# Patient Record
Sex: FEMALE | Race: WHITE | URBAN - METROPOLITAN AREA
[De-identification: names, ages, dates, MRNs, and addresses within clinical notes are randomized per-mention and may not be internally consistent; named-entity substitution may affect disease eponyms.]

---

## 2023-11-16 ENCOUNTER — OFFICE VISIT (OUTPATIENT)
Dept: ORTHOPEDIC SURGERY | Age: 68
End: 2023-11-16

## 2023-11-16 VITALS — HEIGHT: 66 IN

## 2023-11-16 DIAGNOSIS — M17.11 PRIMARY OSTEOARTHRITIS OF RIGHT KNEE: ICD-10-CM

## 2023-11-16 DIAGNOSIS — M25.561 RIGHT KNEE PAIN, UNSPECIFIED CHRONICITY: Primary | ICD-10-CM

## 2023-11-16 PROBLEM — S80.10XA HEMATOMA OF LOWER EXTREMITY: Status: ACTIVE | Noted: 2022-12-08

## 2023-11-16 PROBLEM — L40.9 PSORIASIS: Status: ACTIVE | Noted: 2023-11-16

## 2023-11-16 PROBLEM — E78.5 DYSLIPIDEMIA: Status: ACTIVE | Noted: 2023-05-02

## 2023-11-16 PROBLEM — I10 ESSENTIAL HYPERTENSION: Status: ACTIVE | Noted: 2023-04-05

## 2023-11-16 PROBLEM — G47.33 OBSTRUCTIVE SLEEP APNEA: Status: ACTIVE | Noted: 2018-07-11

## 2023-11-16 PROBLEM — I82.432 ACUTE DEEP VEIN THROMBOSIS (DVT) OF POPLITEAL VEIN OF LEFT LOWER EXTREMITY (HCC): Status: ACTIVE | Noted: 2022-12-08

## 2023-11-16 PROBLEM — E55.9 VITAMIN D DEFICIENCY: Status: ACTIVE | Noted: 2018-06-21

## 2023-11-16 NOTE — PATIENT INSTRUCTIONS
Patient Education        Knee Arthritis: Exercises  Introduction  Here are some examples of exercises for you to try. The exercises may be suggested for a condition or for rehabilitation. Start each exercise slowly. Ease off the exercises if you start to have pain. You will be told when to start these exercises and which ones will work best for you. How to do the exercises  Heel slide (ankles crossed)    Lie on your back with your knees bent. Slide your heel back by bending your affected knee as far as you can. Then hook your other foot around your ankle to help pull your heel even farther back. Hold for about 6 seconds. Return to your starting position. Repeat 8 to 12 times. If you can, repeat these steps for your other knee. Quad set    Sit or lie down on a firm surface or the floor with your affected leg straight. Place a small, rolled-up towel under your knee. Tighten the thigh muscles of your straight leg by pressing the back of your knee down into the towel. Hold for about 6 seconds, then rest.  Repeat 8 to 12 times. It's a good idea to repeat these steps with your other leg. Straight-leg raises to the front    Lie on your back with your good knee bent so that your foot rests flat on the floor. Your affected leg should be straight. Make sure that your low back has a normal curve. You should be able to slip your hand in between the floor and the small of your back, with your palm touching the floor and your back touching the back of your hand. Tighten the thigh muscles in your affected leg by pressing the back of your knee flat down to the floor. Hold your knee straight. Keeping the thigh muscles tight and your leg straight, lift your affected leg up so that your heel is about 12 inches off the floor. Hold for about 6 seconds, then lower slowly. Relax for up to 10 seconds between repetitions. Repeat 8 to 12 times. Switch legs and repeat steps 1 through 5, even if only one knee is sore.   Active

## 2023-11-16 NOTE — PROGRESS NOTES
Name: Migdalia Turner  YOB: 1955  Gender: female  MRN: [id number]    CC:   Chief Complaint   Patient presents with    Knee Pain     Right knee         HPI: Migdalia Turner is a 70 y.o. female with a PMHx of HTN, DVT on xarelto , and s/p left TKA in 2023 by Dr. Carolann Jones here for evaluation of right knee pain. The pain has been present for years and is becoming worse. The pain is primarily on the Medial side. she describes the pain as dull and aching  The pain is worse with going up and/or down stairs and walking  The pain does not radiate down the leg. she denies numbness and tingling down the leg. Treatment so far has been activity modification, Tylenol, and visco supplementation injections with relief. Last durolane injection was in July of this year and has helped well with her symptoms at this time. Not on File      Review of Systems:  As per HPI. Pertinent positives and negatives are addressed with the patient, particularly those related to musculoskeletal concerns. Non-orthopaedic concerns were referred back to the primary care physician. PHYSICAL EXAMINATION:   The patient appears their stated age and they are in no distress. Ht 1.676 m (5' 6\")       The lower extremities are as described below. Circulation is normal with palpable pedal pulses bilaterally and no edema. There is no lymph adenopathy in the popliteal or malleolar region. The skin is without stasis disease distally bilaterally. Sensation is intact to light touch bilaterally. There is mild tenderness to palpation over the medial joint line of the right knee(s)  The gait is noted to be with a slight trendelenburg and antalgia  Range of motion is 0-120 degrees on the right and 0-120 degrees on the left.  right knee: There is 2mm of anterior/posterior translation and 2mm of medial/lateral instability bilaterally. There is 2 degrees of varus alignment in the right knee.   Limb lengths are equal.  Quadriceps strength

## 2024-01-25 ENCOUNTER — OFFICE VISIT (OUTPATIENT)
Dept: ORTHOPEDIC SURGERY | Age: 69
End: 2024-01-25

## 2024-01-25 DIAGNOSIS — M17.11 PRIMARY OSTEOARTHRITIS OF RIGHT KNEE: Primary | ICD-10-CM

## 2024-01-25 NOTE — PROGRESS NOTES
Name: Aliya Adams  YOB: 1955  Gender: female  MRN: [id number]    Not on File    CC:  right knee pain, Osteoarthritis    PROCEDURE: HA injection(s). All questions answered.     Procedure Note: The patient was placed in upright position with both knees hanging freely from exam table.  The right knee was prepped in sterile fashion using alcohol wipe(s).  Using an infrapatellar approach, 3cc of Durolane was injected freely.  The needle was then removed, pressure hemostatis achieved, injection site was cleansed with alcohol wipe and dressed with band aid.    The patient tolerated the procedure without complication.  The patient  will follow up as scheduled.    CARLOS MAO JR, MD  01/25/24

## 2024-01-26 ENCOUNTER — TELEPHONE (OUTPATIENT)
Dept: ORTHOPEDIC SURGERY | Age: 69
End: 2024-01-26

## 2024-01-26 NOTE — TELEPHONE ENCOUNTER
Confirmed patient [place] walking around the mall after injection, was in lots of knee pain last night but is better today. Instructed to take it easy the next 24 hours ice & will continue tylenol/ibuprofen. The patient will update us in the next 3-4 days if medicine has not kicked I or pain is not much better.

## 2024-01-26 NOTE — TELEPHONE ENCOUNTER
She had significant pain in her knee that she got the gel shot in. She wants to know if this is normal. She is asking for a return call.

## 2024-04-17 ENCOUNTER — TELEPHONE (OUTPATIENT)
Dept: ORTHOPEDIC SURGERY | Age: 69
End: 2024-04-17

## 2024-08-22 ENCOUNTER — TELEPHONE (OUTPATIENT)
Dept: ORTHOPEDIC SURGERY | Age: 69
End: 2024-08-22

## 2024-08-22 DIAGNOSIS — M17.11 PRIMARY OSTEOARTHRITIS OF RIGHT KNEE: Primary | ICD-10-CM

## 2024-09-17 ENCOUNTER — OFFICE VISIT (OUTPATIENT)
Dept: ORTHOPEDIC SURGERY | Age: 69
End: 2024-09-17
Payer: MEDICARE

## 2024-09-17 DIAGNOSIS — M17.11 PRIMARY OSTEOARTHRITIS OF RIGHT KNEE: Primary | ICD-10-CM

## 2024-09-17 PROCEDURE — 20610 DRAIN/INJ JOINT/BURSA W/O US: CPT | Performed by: ORTHOPAEDIC SURGERY

## 2024-12-11 ENCOUNTER — TELEPHONE (OUTPATIENT)
Dept: ORTHOPEDIC SURGERY | Age: 69
End: 2024-12-11

## 2024-12-11 NOTE — TELEPHONE ENCOUNTER
Spoke with patient and will scheduled for rt tka on 05/23/25 and put her on the cancellation list for earlier date

## 2025-01-28 ENCOUNTER — OFFICE VISIT (OUTPATIENT)
Dept: ORTHOPEDIC SURGERY | Age: 70
End: 2025-01-28
Payer: MEDICARE

## 2025-01-28 DIAGNOSIS — M17.11 PRIMARY OSTEOARTHRITIS OF RIGHT KNEE: Primary | ICD-10-CM

## 2025-01-28 PROCEDURE — 99215 OFFICE O/P EST HI 40 MIN: CPT | Performed by: ORTHOPAEDIC SURGERY

## 2025-01-28 PROCEDURE — G8427 DOCREV CUR MEDS BY ELIG CLIN: HCPCS | Performed by: ORTHOPAEDIC SURGERY

## 2025-01-28 PROCEDURE — G8421 BMI NOT CALCULATED: HCPCS | Performed by: ORTHOPAEDIC SURGERY

## 2025-01-28 PROCEDURE — 1090F PRES/ABSN URINE INCON ASSESS: CPT | Performed by: ORTHOPAEDIC SURGERY

## 2025-01-28 PROCEDURE — 1159F MED LIST DOCD IN RCRD: CPT | Performed by: ORTHOPAEDIC SURGERY

## 2025-01-28 PROCEDURE — 1123F ACP DISCUSS/DSCN MKR DOCD: CPT | Performed by: ORTHOPAEDIC SURGERY

## 2025-01-28 PROCEDURE — 4004F PT TOBACCO SCREEN RCVD TLK: CPT | Performed by: ORTHOPAEDIC SURGERY

## 2025-01-28 PROCEDURE — 1160F RVW MEDS BY RX/DR IN RCRD: CPT | Performed by: ORTHOPAEDIC SURGERY

## 2025-01-28 PROCEDURE — 3017F COLORECTAL CA SCREEN DOC REV: CPT | Performed by: ORTHOPAEDIC SURGERY

## 2025-01-28 PROCEDURE — G8400 PT W/DXA NO RESULTS DOC: HCPCS | Performed by: ORTHOPAEDIC SURGERY

## 2025-01-28 RX ORDER — CETIRIZINE HYDROCHLORIDE 10 MG/1
TABLET ORAL PRN
COMMUNITY

## 2025-01-28 RX ORDER — ATORVASTATIN CALCIUM 40 MG/1
TABLET, FILM COATED ORAL
COMMUNITY

## 2025-01-28 RX ORDER — VITAMIN B COMPLEX
1 CAPSULE ORAL PRN
COMMUNITY

## 2025-01-28 RX ORDER — LISINOPRIL 20 MG/1
TABLET ORAL
COMMUNITY
Start: 2024-12-30

## 2025-01-28 RX ORDER — CLOBETASOL PROPIONATE 0.5 MG/G
CREAM TOPICAL PRN
COMMUNITY

## 2025-01-28 RX ORDER — METHYLPREDNISOLONE ACETATE 40 MG/ML
80 INJECTION, SUSPENSION INTRA-ARTICULAR; INTRALESIONAL; INTRAMUSCULAR; SOFT TISSUE ONCE
Status: CANCELLED | OUTPATIENT
Start: 2025-01-28 | End: 2025-01-28

## 2025-01-28 RX ORDER — BETAMETHASONE DIPROPIONATE 0.5 MG/G
CREAM TOPICAL
COMMUNITY

## 2025-01-28 RX ORDER — MELOXICAM 7.5 MG/1
7.5 TABLET ORAL DAILY
COMMUNITY

## 2025-01-28 NOTE — PROGRESS NOTES
Name: Aliya Adams  YOB: 1955  Gender: female  MRN: [id number]    CC:   Chief Complaint   Patient presents with    Knee Pain     Rt knee pain- poss cortisone injection         HPI: Aliya Adams is a 69 y.o. female with a PMHx of HTN, DVT on xarelto , and s/p left TKA in 2023 by Dr. Harding here for follow up evaluation of right knee pain.  The pain has been present for years and is becoming worse. The pain is primarily on the Medial side.   she describes the pain as dull and aching  The pain is worse with going up and/or down stairs and walking  The pain does not radiate down the leg.  she denies numbness and tingling down the leg.   Treatment so far has been activity modification, Tylenol, and visco supplementation injections with relief. Last durolane injection was in September did not help with her symptoms.  She is ready to discuss proceeding with surgical intervention      Not on File      Review of Systems:  As per HPI.  Pertinent positives and negatives are addressed with the patient, particularly those related to musculoskeletal concerns.   Non-orthopaedic concerns were referred back to the primary care physician.    PHYSICAL EXAMINATION:   The patient appears their stated age and they are in no distress.  There were no vitals taken for this visit.      The lower extremities are as described below.  Circulation is normal with palpable pedal pulses bilaterally and no edema.  There is no lymph adenopathy in the popliteal or malleolar region.  The skin is without stasis disease distally bilaterally.  Sensation is intact to light touch bilaterally.  There is mild tenderness to palpation over the medial joint line of the right knee(s)  The gait is noted to be with a slight trendelenburg and antalgia  Range of motion is 0-120 degrees on the right and 0-120 degrees on the left.  right knee: There is 2mm of anterior/posterior translation and 2mm of medial/lateral instability bilaterally.  There is

## 2025-01-29 PROBLEM — M17.11 PRIMARY OSTEOARTHRITIS OF RIGHT KNEE: Status: ACTIVE | Noted: 2025-01-28

## 2025-03-03 ENCOUNTER — PREP FOR PROCEDURE (OUTPATIENT)
Dept: ORTHOPEDIC SURGERY | Age: 70
End: 2025-03-03

## 2025-03-03 DIAGNOSIS — M17.11 PRIMARY OSTEOARTHRITIS OF RIGHT KNEE: Primary | ICD-10-CM

## 2025-03-03 RX ORDER — ACETAMINOPHEN 325 MG/1
1000 TABLET ORAL ONCE
Status: CANCELLED | OUTPATIENT
Start: 2025-03-03 | End: 2025-03-03

## 2025-03-03 RX ORDER — SODIUM CHLORIDE 0.9 % (FLUSH) 0.9 %
5-40 SYRINGE (ML) INJECTION PRN
Status: CANCELLED | OUTPATIENT
Start: 2025-03-03

## 2025-03-03 RX ORDER — SODIUM CHLORIDE 0.9 % (FLUSH) 0.9 %
5-40 SYRINGE (ML) INJECTION EVERY 12 HOURS SCHEDULED
Status: CANCELLED | OUTPATIENT
Start: 2025-03-03

## 2025-03-03 RX ORDER — SODIUM CHLORIDE 9 MG/ML
INJECTION, SOLUTION INTRAVENOUS PRN
Status: CANCELLED | OUTPATIENT
Start: 2025-03-03

## 2025-03-10 ENCOUNTER — HOSPITAL ENCOUNTER (OUTPATIENT)
Dept: SURGERY | Age: 70
Discharge: HOME | End: 2025-03-13
Payer: MEDICARE

## 2025-03-10 ENCOUNTER — HOSPITAL ENCOUNTER (OUTPATIENT)
Dept: REHABILITATION | Age: 70
Discharge: HOME | End: 2025-03-13
Payer: MEDICARE

## 2025-03-10 VITALS
WEIGHT: 268.1 LBS | OXYGEN SATURATION: 98 % | DIASTOLIC BLOOD PRESSURE: 82 MMHG | HEIGHT: 66 IN | RESPIRATION RATE: 16 BRPM | BODY MASS INDEX: 43.09 KG/M2 | SYSTOLIC BLOOD PRESSURE: 141 MMHG | HEART RATE: 76 BPM

## 2025-03-10 DIAGNOSIS — M17.11 PRIMARY OSTEOARTHRITIS OF RIGHT KNEE: ICD-10-CM

## 2025-03-10 LAB
ALBUMIN SERPL-MCNC: 3.8 G/DL (ref 3.2–4.6)
ALBUMIN/GLOB SERPL: 1.2 (ref 1–1.9)
ALP SERPL-CCNC: 117 U/L (ref 35–104)
ALT SERPL-CCNC: 21 U/L (ref 8–45)
ANION GAP SERPL CALC-SCNC: 15 MMOL/L (ref 7–16)
AST SERPL-CCNC: 29 U/L (ref 15–37)
BASOPHILS # BLD: 0.08 K/UL (ref 0–0.2)
BASOPHILS NFR BLD: 1.1 % (ref 0–2)
BILIRUB SERPL-MCNC: 0.8 MG/DL (ref 0–1.2)
BUN SERPL-MCNC: 25 MG/DL (ref 8–23)
CALCIUM SERPL-MCNC: 9.2 MG/DL (ref 8.8–10.2)
CHLORIDE SERPL-SCNC: 102 MMOL/L (ref 98–107)
CO2 SERPL-SCNC: 20 MMOL/L (ref 20–29)
CREAT SERPL-MCNC: 0.72 MG/DL (ref 0.6–1.1)
DIFFERENTIAL METHOD BLD: ABNORMAL
EKG ATRIAL RATE: 72 BPM
EKG DIAGNOSIS: NORMAL
EKG P AXIS: 24 DEGREES
EKG P-R INTERVAL: 212 MS
EKG Q-T INTERVAL: 422 MS
EKG QRS DURATION: 90 MS
EKG QTC CALCULATION (BAZETT): 462 MS
EKG R AXIS: -63 DEGREES
EKG T AXIS: -15 DEGREES
EKG VENTRICULAR RATE: 72 BPM
EOSINOPHIL # BLD: 0.21 K/UL (ref 0–0.8)
EOSINOPHIL NFR BLD: 2.8 % (ref 0.5–7.8)
ERYTHROCYTE [DISTWIDTH] IN BLOOD BY AUTOMATED COUNT: 12.4 % (ref 11.9–14.6)
EST. AVERAGE GLUCOSE BLD GHB EST-MCNC: 119 MG/DL
GLOBULIN SER CALC-MCNC: 3.2 G/DL (ref 2.3–3.5)
GLUCOSE SERPL-MCNC: 116 MG/DL (ref 70–99)
HBA1C MFR BLD: 5.8 % (ref 0–5.6)
HCT VFR BLD AUTO: 44.3 % (ref 35.8–46.3)
HGB BLD-MCNC: 14.8 G/DL (ref 11.7–15.4)
IMM GRANULOCYTES # BLD AUTO: 0.02 K/UL (ref 0–0.5)
IMM GRANULOCYTES NFR BLD AUTO: 0.3 % (ref 0–5)
INR PPP: 1
LYMPHOCYTES # BLD: 2.24 K/UL (ref 0.5–4.6)
LYMPHOCYTES NFR BLD: 30.1 % (ref 13–44)
MCH RBC QN AUTO: 29.7 PG (ref 26.1–32.9)
MCHC RBC AUTO-ENTMCNC: 33.4 G/DL (ref 31.4–35)
MCV RBC AUTO: 89 FL (ref 82–102)
MONOCYTES # BLD: 0.93 K/UL (ref 0.1–1.3)
MONOCYTES NFR BLD: 12.5 % (ref 4–12)
NEUTS SEG # BLD: 3.96 K/UL (ref 1.7–8.2)
NEUTS SEG NFR BLD: 53.2 % (ref 43–78)
NRBC # BLD: 0 K/UL (ref 0–0.2)
PLATELET # BLD AUTO: 258 K/UL (ref 150–450)
PMV BLD AUTO: 10 FL (ref 9.4–12.3)
POTASSIUM SERPL-SCNC: 4.4 MMOL/L (ref 3.5–5.1)
PROT SERPL-MCNC: 7 G/DL (ref 6.3–8.2)
PROTHROMBIN TIME: 13.6 SEC (ref 11.3–14.9)
RBC # BLD AUTO: 4.98 M/UL (ref 4.05–5.2)
SODIUM SERPL-SCNC: 137 MMOL/L (ref 136–145)
WBC # BLD AUTO: 7.4 K/UL (ref 4.3–11.1)

## 2025-03-10 PROCEDURE — 93005 ELECTROCARDIOGRAM TRACING: CPT | Performed by: ANESTHESIOLOGY

## 2025-03-10 PROCEDURE — 94760 N-INVAS EAR/PLS OXIMETRY 1: CPT

## 2025-03-10 PROCEDURE — 80053 COMPREHEN METABOLIC PANEL: CPT

## 2025-03-10 PROCEDURE — 93010 ELECTROCARDIOGRAM REPORT: CPT | Performed by: INTERNAL MEDICINE

## 2025-03-10 PROCEDURE — 83036 HEMOGLOBIN GLYCOSYLATED A1C: CPT

## 2025-03-10 PROCEDURE — 85610 PROTHROMBIN TIME: CPT

## 2025-03-10 PROCEDURE — 85025 COMPLETE CBC W/AUTO DIFF WBC: CPT

## 2025-03-10 PROCEDURE — 97161 PT EVAL LOW COMPLEX 20 MIN: CPT

## 2025-03-10 PROCEDURE — 87641 MR-STAPH DNA AMP PROBE: CPT

## 2025-03-10 RX ORDER — SEMAGLUTIDE 0.68 MG/ML
0.25 INJECTION, SOLUTION SUBCUTANEOUS WEEKLY
COMMUNITY
Start: 2025-02-06

## 2025-03-10 RX ORDER — MUPIROCIN 20 MG/G
1 OINTMENT TOPICAL DAILY
COMMUNITY

## 2025-03-10 ASSESSMENT — PAIN DESCRIPTION - DESCRIPTORS
DESCRIPTORS: ACHING;CRAMPING
DESCRIPTORS: THROBBING

## 2025-03-10 ASSESSMENT — KOOS JR
STANDING UPRIGHT: MILD
TWISING OR PIVOTING ON KNEE: MODERATE
KOOS JR TOTAL INTERVAL SCORE: 73.342
GOING UP OR DOWN STAIRS: MILD
HOW SEVERE IS YOUR KNEE STIFFNESS AFTER FIRST WAKING IN MORNING: MILD

## 2025-03-10 ASSESSMENT — PAIN DESCRIPTION - ORIENTATION: ORIENTATION: RIGHT

## 2025-03-10 ASSESSMENT — PULMONARY FUNCTION TESTS
FEV1 (%PREDICTED): 89
FEV1 (LITERS): 1.98

## 2025-03-10 ASSESSMENT — PAIN SCALES - GENERAL: PAINLEVEL_OUTOF10: 3

## 2025-03-10 ASSESSMENT — PAIN DESCRIPTION - LOCATION
LOCATION: KNEE
LOCATION: KNEE

## 2025-03-10 NOTE — PERIOP NOTE
Patient verified name and .    Order for consent was found in EHR and does match case posting; patient verified.     Type 3 surgery, [facility] assessment complete.    Labs per surgeon: CBC,CMP, A1C, PTINR ; results pending  Labs per anesthesia protocol: no additional  EKG:3/10/25; No echo in patients chart    MRSA/MSSA swab collected per policy. MD to consult pharmacy to dose Vanc if appropriate.     Hospital approved surgical skin cleanser and instructions to return bottle on DOS given per hospital policy.    Patient provided with handouts including Guide to Surgery, Pain Management, Preventing Surgical Site Infections, and [facility] Brochure.    Patient answered medical/surgical history questions at their best of ability. All prior to admission medications documented in Epic. Original medication prescription bottle was visualized during patient appointment.     Patient instructed to hold all vitamins 3 weeks prior to surgery and NSAIDS 5 days prior to surgery.     Patient teach back successful and patient demonstrates knowledge of instruction.

## 2025-03-10 NOTE — PERIOP NOTE
PLEASE CONTINUE TAKING ALL PRESCRIPTION MEDICATIONS UP TO THE DAY OF SURGERY UNLESS OTHERWISE DIRECTED BELOW.    DISCONTINUE all vitamins, herbals and supplements 3 weeks prior to surgery. DISCONTINUE Non-Steroidal Anti-Inflammatory (NSAIDS) such as Advil, Ibuprofen, Motrin, Naproxen and Aleve 5 days prior to surgery.       Home Medications to take  the day of surgery    Cetirizine (Zyrtec) or Claritan             Home Medications to Hold- please continue all other medications except these.    Hold Meloxicam 5 days prior to surgery.         Comments   On the day before surgery please take Acetaminophen 1000mg in the morning and then again before bed. You may substitute for Tylenol 650 mg.      Bring Dynahex wash and Incentive Spirometer with you to hospital on the day of surgery.            Please do not bring home medications with you on the day of surgery unless otherwise directed by your nurse.  If you are instructed to bring home medications, please give them to your nurse as they will be administered by the nursing staff.    If you have any questions, please call [facility] [phone].    A copy of this note was provided to the patient for reference.

## 2025-03-10 NOTE — CARE COORDINATION
[facility] Case Management note:  Patient screened in Prehab for discharge planning needs. Patient scheduled for a future total joint replacement.  We discussed Home Health and equipment needed after surgery. List of Home Health providers offered.  Patient w/o preference towards provider.  We will arrange Home health on the day of surgery.  Has walker and RTS.

## 2025-03-10 NOTE — PROGRESS NOTES
Aliya Adams  : 1955  Primary: [facility] Medicare Complete  Secondary:  Joint [facility] at [place] [address]  [phone]      Physical Therapy Prehab Evaluation Summary:3/10/2025   Time In/Out   PT Charge Capture  Episode     MEDICAL/REFERRING DIAGNOSIS: Unilateral primary osteoarthritis, right knee [M17.11]  REFERRING PHYSICIAN: Manohar Moy Jr., *    Treatment Diagnosis:   Pain in Right Knee (M25.561)  Stiffness of Right Knee, Not elsewhere classified (M25.661)  Difficulty in walking, Not elsewhere classified (R26.2)    DATE OF SURGERY: 3/31/25  Assessment:   COMMENTS:  Ms. Adams is present for a Prehab Physical Therapy Assessment for their upcoming right TKA. They are here alone. After discussing the surgical admission options and discharge plans, they are planning on discharging after one night in the hospital.    She lives alone and will have support from a friend. She had a left tka in .    PROBLEM LIST:   (Impacting functional limitations):  Ms. Adams presents with the following lower extremity(s) problems:  Strength  Range of Motion  Home Exercise Program  Pain INTERVENTIONS PLANNED:   (Benefits and precautions of physical therapy have been discussed with the patient.)  Home Exercise Program  Educational Discussion       GOALS: (Goals have been discussed and agreed upon with patient.)  Discharge Goals: Time Frame: 1 Day  Patient will demonstrate independence with a home exercise program designed to increase strength, range of motion, and pain control to minimize functional deficits and optimize patient for total joint replacement.    Subjective:   Past Medical History/Comorbidities:   Ms. Adams  has a past medical history of Hx of blood clots, Hyperlipidemia, Hypertension, PONV (postoperative nausea and vomiting), and Sleep apnea.  Ms. Adams  has a past surgical history that includes Hammer toe surgery (Left); Hysterectomy; Total knee  Lab work reviewed mild abnormal findings but stable not critical or urgent  Follow-up appointment on 419 will discuss in detail with patient

## 2025-03-10 NOTE — PROGRESS NOTES
03/10/25 1000   Treatment   Treatment Type Bedside spirometry   Breath Sounds   Breath Sounds Bilateral Clear   Oxygen Therapy/Pulse Ox   O2 Therapy Room air   Pulse 76   SpO2 98 %   Pulse Oximeter Device Mode Intermittent   $Pulse Oximeter $Spot check (single)   Bedside Spirometry   FEV-1/Actual (Liters) 1.98 Liters   FEV-1/Predicted (Liters) 89 Liters     Initial respiratory Assessment completed with pt. Pt was interviewed and evaluated in Joint camp prior to surgery.  Patient ID:  Aliya Adams  [id number]  70 y.o.  1955  Surgeon: Dr. Moy  Date of Surgery: [unfilled]3/31/2025  Procedure: Total Right Knee Arthroplasty  Primary Care Physician: Rochelle Bates [phone]  Specialists:DR GOLDBERG- 2/27/2025- FOR MARKUS    Pt taught proper COUGH technique  IS REVIEWED WITH PT AS WELL AS BENEFITS OF USING IS IN SEDENTARY PTS.  DIAPHRAGMATIC BREATHING EXERCISE INSTRUCTIONS GIVEN    History of smoking:   CURRENT SMOKER-       1   PACK/WEEK for     50        YEARS  Smoking Cessation  \"SMOKING: YOUR PLAN TO QUIT\" handout given to pt.    Pt taught to identify when anxiety or stress  is a trigger .  Relaxation breathing technique taught to pt.                 Quit date:     STATES 2 WEEKS AGO    Secondhand smoke:MOTHER    Past procedures with Oxygen desaturation or delayed awakening:DENIES     Respiratory history:HX OF DVT                                 SOB  ON EXERTION                                  MARKUS - CPAP  Respiratory meds:  DENIES    FAMILY PRESENT:             NO     PAST SLEEP STUDY:        YES                  IN [facility]  HX OF MARKUS:                        YES                    WORN CPA FOR YEARS  MARKUS assessment:     DANGERS OF UNTREATED MARKUS EXPLAINED TO PT.                                          SLEEPS ON SIDE         PHYSICAL EXAM   Body mass index is 43.27 kg/m².   Vitals:    03/10/25 1000   BP:    Pulse: (P) 76   Resp:    SpO2: (P) 98%     Neck circumference:   45   cm    Loud

## 2025-03-11 DIAGNOSIS — Z96.651 S/P TOTAL KNEE ARTHROPLASTY, RIGHT: Primary | ICD-10-CM

## 2025-03-11 LAB
MRSA DNA SPEC QL NAA+PROBE: NOT DETECTED
S AUREUS CPE NOSE QL NAA+PROBE: NOT DETECTED

## 2025-03-11 ASSESSMENT — PROMIS GLOBAL HEALTH SCALE
IN THE PAST 7 DAYS, HOW WOULD YOU RATE YOUR PAIN ON AVERAGE [ON A SCALE FROM 0 (NO PAIN) TO 10 (WORST IMAGINABLE PAIN)]?: 3
IN THE PAST 7 DAYS, HOW OFTEN HAVE YOU BEEN BOTHERED BY EMOTIONAL PROBLEMS, SUCH AS FEELING ANXIOUS, DEPRESSED, OR IRRITABLE [ON A SCALE FROM 1 (NEVER) TO 5 (ALWAYS)]?: RARELY
TO WHAT EXTENT ARE YOU ABLE TO CARRY OUT YOUR EVERYDAY PHYSICAL ACTIVITIES SUCH AS WALKING, CLIMBING STAIRS, CARRYING GROCERIES, OR MOVING A CHAIR [ON A SCALE OF 1 (NOT AT ALL) TO 5 (COMPLETELY)]?: MOSTLY
IN GENERAL, WOULD YOU SAY YOUR QUALITY OF LIFE IS...[ON A SCALE OF 1 (POOR) TO 5 (EXCELLENT)]: EXCELLENT
IN GENERAL, WOULD YOU SAY YOUR HEALTH IS...[ON A SCALE OF 1 (POOR) TO 5 (EXCELLENT)]: VERY GOOD
IN GENERAL, PLEASE RATE HOW WELL YOU CARRY OUT YOUR USUAL SOCIAL ACTIVITIES (INCLUDES ACTIVITIES AT HOME, AT WORK, AND IN YOUR COMMUNITY, AND RESPONSIBILITIES AS A PARENT, CHILD, SPOUSE, EMPLOYEE, FRIEND, ETC) [ON A SCALE OF 1 (POOR) TO 5 (EXCELLENT)]?: VERY GOOD
SUM OF RESPONSES TO QUESTIONS 3, 6, 7, & 8: 15
WHO IS THE PERSON COMPLETING THE PROMIS V1.1 SURVEY?: SELF
IN GENERAL, HOW WOULD YOU RATE YOUR SATISFACTION WITH YOUR SOCIAL ACTIVITIES AND RELATIONSHIPS [ON A SCALE OF 1 (POOR) TO 5 (EXCELLENT)]?: EXCELLENT
IN GENERAL, HOW WOULD YOU RATE YOUR MENTAL HEALTH, INCLUDING YOUR MOOD AND YOUR ABILITY TO THINK [ON A SCALE OF 1 (POOR) TO 5 (EXCELLENT)]?: VERY GOOD
HOW IS THE PROMIS V1.1 BEING ADMINISTERED?: PAPER
IN THE PAST 7 DAYS, HOW WOULD YOU RATE YOUR FATIGUE ON AVERAGE [ON A SCALE FROM 1 (NONE) TO 5 (VERY SEVERE)]?: MILD
IN GENERAL, HOW WOULD YOU RATE YOUR PHYSICAL HEALTH [ON A SCALE OF 1 (POOR) TO 5 (EXCELLENT)]?: VERY GOOD
SUM OF RESPONSES TO QUESTIONS 2, 4, 5, & 10: 18

## 2025-03-25 ENCOUNTER — OFFICE VISIT (OUTPATIENT)
Dept: ORTHOPEDIC SURGERY | Age: 70
End: 2025-03-25

## 2025-03-25 DIAGNOSIS — M17.11 PRIMARY OSTEOARTHRITIS OF RIGHT KNEE: Primary | ICD-10-CM

## 2025-03-25 PROCEDURE — PREOPEXAM PRE-OP EXAM: Performed by: PHYSICIAN ASSISTANT

## 2025-03-25 RX ORDER — OXYCODONE HYDROCHLORIDE 5 MG/1
5-10 TABLET ORAL
Qty: 60 TABLET | Refills: 0 | Status: SHIPPED | OUTPATIENT
Start: 2025-03-29 | End: 2025-04-03

## 2025-03-25 RX ORDER — MELOXICAM 7.5 MG/1
7.5-15 TABLET ORAL DAILY
Qty: 60 TABLET | Refills: 1 | Status: SHIPPED | OUTPATIENT
Start: 2025-03-29

## 2025-03-25 RX ORDER — METHOCARBAMOL 750 MG/1
750 TABLET, FILM COATED ORAL 3 TIMES DAILY PRN
Qty: 40 TABLET | Refills: 1 | Status: SHIPPED | OUTPATIENT
Start: 2025-03-29

## 2025-03-25 RX ORDER — ASPIRIN 81 MG/1
81 TABLET ORAL 2 TIMES DAILY
Qty: 70 TABLET | Refills: 0 | Status: SHIPPED | OUTPATIENT
Start: 2025-03-29 | End: 2025-05-03

## 2025-03-25 RX ORDER — PROMETHAZINE HYDROCHLORIDE 12.5 MG/1
12.5 TABLET ORAL 4 TIMES DAILY PRN
Qty: 20 TABLET | Refills: 2 | Status: SHIPPED | OUTPATIENT
Start: 2025-03-29

## 2025-03-25 NOTE — H&P (VIEW-ONLY)
[facility]  Pre Operative History and Physical Exam    Patient ID:  Aliya Adams  [id number]  70 y.o.  1955    Today: March 25, 2025           CC: Right knee pain    HPI:   The patient has end stage arthritis of the right knee. The patient was evaluated and examined during a consultation prior to this office visit.  There have been no changes to the patient's orthopedic condition since the initial consultation. The patient has failed previous conservative treatment for this condition including antiinflammatories , and lifestyle modifications. The necessity for joint replacement is present. The patient will be admitted the day of surgery for right knee replacement    Past Medical/Surgical History:  Past Medical History:   Diagnosis Date    CAD (coronary artery disease)     Hx of blood clots     dvt LEFT CALF 2022    Hyperlipidemia     Hypertension     PONV (postoperative nausea and vomiting)     Sleep apnea      Past Surgical History:   Procedure Laterality Date    HAMMER TOE SURGERY Left     HYSTERECTOMY (CERVIX STATUS UNKNOWN)      SKIN CANCER EXCISION Left     ON BACK    TOTAL KNEE ARTHROPLASTY Left         Allergies:   Allergies   Allergen Reactions    Adhesive Tape Rash    Bacitracin-Polymyxin B Itching    Mangifera Indica Rash     \"SKIN ON RAY\"    Neomycin Hives     Swelling    Sulfa Antibiotics Other (See Comments)     fever        Physical Exam:   General: NAD, Alert, Oriented, Appears their stated age     HEENT: NC/AT    Skin: No rashes, lesions or wounds seen      Psych: normal affect      Heart: Regular Rate, Rhythm     Lungs: unlabored respirations, no wheezing    Abdomen: Soft and non-distended     Ortho: Pain with limited ROM of the right knee    Neuro: no focal defects, moving extremities equally    Lymph: no lymphadenopathy     Meds:   Current Outpatient Medications   Medication Sig    mupirocin (BACTROBAN) 2 % ointment Apply 1 Application topically daily    OZEMPIC, 0.25  by CARLOS LEIVA ([id number]) on 3/10/2025 1:22:55 PM                   Patient Active Problem List   Diagnosis    Acute deep vein thrombosis (DVT) of popliteal vein of left lower extremity (HCC)    Dyslipidemia    Essential hypertension    Hematoma of lower extremity    Obstructive sleep apnea    Vitamin D deficiency    Psoriasis    Primary osteoarthritis of right knee         Assessment:   1. Arthritis of the right knee      Plan:    1. Proceed with scheduled right knee replacement      All material risks, benefits, and reasonable alternatives including postponing the procedure were discussed. The patient does wish to proceed with the procedure at this time.         Signed By: ARPAN Bassett  March 25, 2025

## 2025-03-25 NOTE — PROGRESS NOTES
OR 0.5 MG/DOSE, 2 MG/3ML SOPN Inject 0.25 mg into the skin once a week Injects on Fridays, Takes for weight loss not diabetes    atorvastatin (LIPITOR) 40 MG tablet TAKE ONE TABLET BY MOUTH THREE TIMES A WEEK    b complex vitamins capsule Take 1 capsule by mouth as needed (Patient not taking: Reported on 3/10/2025)    lisinopril (PRINIVIL;ZESTRIL) 20 MG tablet TAKE ONE AND ONE-HALF TABLETS BY MOUTH ONE TIME DAILY    meloxicam (MOBIC) 7.5 MG tablet Take 1 tablet by mouth daily    clobetasol (TEMOVATE) 0.05 % cream Apply topically as needed    augmented betamethasone dipropionate (DIPROLENE-AF) 0.05 % cream Apply topically    vitamin D3 (CHOLECALCIFEROL) 125 MCG (5000 UT) TABS tablet Take 1 tablet by mouth every morning    cetirizine (ZYRTEC) 10 MG tablet Take by mouth as needed     No current facility-administered medications for this visit.         Labs:  Hospital Outpatient Visit on 03/10/2025   Component Date Value Ref Range Status    Hemoglobin A1C 03/10/2025 5.8 (H)  0 - 5.6 % Final    Comment: Reference Range  Normal       <5.7%  Prediabetes  5.7-6.4%  Diabetes     >6.4%      Estimated Avg Glucose 03/10/2025 119  mg/dL Final    Protime 03/10/2025 13.6  11.3 - 14.9 sec Final    PLEASE NOTE NEW REFERENCE RANGE    INR 03/10/2025 1.0    Final    Comment: Suggested therapeutic INR range:  Venous thrombosis and embolus  2.0-3.0  Prosthetic heart valve         2.5-3.5  ** Note new reference range and method **      Sodium 03/10/2025 137  136 - 145 mmol/L Final    Potassium 03/10/2025 4.4  3.5 - 5.1 mmol/L Final    Specimen hemolysis has exceeded the interference as defined by Roche. Value may be false increased.     Chloride 03/10/2025 102  98 - 107 mmol/L Final    CO2 03/10/2025 20  20 - 29 mmol/L Final    Anion Gap 03/10/2025 15  7 - 16 mmol/L Final    Glucose 03/10/2025 116 (H)  70 - 99 mg/dL Final    Comment: <70 mg/dL Consistent with, but not fully diagnostic of hypoglycemia.  100 - 125 mg/dL Impaired fasting

## 2025-03-28 NOTE — DISCHARGE INSTRUCTIONS
[facility]   Patient Discharge Instructions    Aliya Adams / [id number] : 1955    Admitted (Not on file) Discharged: 3/28/2025     IF YOU HAVE ANY PROBLEMS ONCE YOU ARE AT HOME CALL THE FOLLOWING NUMBERS:   Nurse's line: [phone]  Main office number: [phone]      Medications    The medications you are to continue on are listed on the medication reconciliation sheet.   Narcotic pain medications as well as supplemental iron can cause constipation. If this occurs, try over the counter stool softeners or try stopping the narcotic pain medication and/or the iron.   It is important that you take the medication exactly as they are prescribed.  Medications which increase your risk of blood clots are listed to stop for 5 weeks after surgery as well as medications or supplements which increase your risk of bleeding complications.   Keep your medication in the bottles provided by the pharmacist and keep a list of the medication names, dosages, and times to be taken in your wallet.   Do not take other medications without consulting your doctor.  If you need a refill on your pain medication, please note our office is closed over the weekend. It is our office policy that on-call providers cannot refill narcotic pain medications over the weekend. If you will need a refill over the weekend, please call our office before 12pm on Friday or first thing Monday morning.        Important Information    Do NOT smoke as this will greatly increase your risk of infection!    Resume your prehospital diet. If you have excessive nausea or vomitting call your doctor's office     Leg swelling and warmth is normal for 6 months after surgery. If you experience swelling in your leg, elevate your leg while laying down with your toes above your heart. If you have sudden onset severe swelling with leg pain call our office. Use Behzad Hose stockings until we see you in the office for your follow up  appointment.    The stitches deep inside take approximately 6 months to dissolve. There will be sharp shooting, stinging and burning pain. This is normal and will resolve between 3-6 months after surgery.     Difficulty sleeping is normal following total Knee and Hip replacement. You may try melatonin, an over-the-counter sleep aid or benadryl to help with sleep. Most patients will resume sleeping through the night 8 weeks after surgery.     Home Physical Therapy is arranged. Home Health will contact you within 48 hrs of discharge that you have chosen. If you have not received a call within this time frame please contact that provider you chose. You should be given this information before you leave the hospital.     You are at a risk for falls. Use the rolling walker when walking.     Patients who have had a joint replacement should not drive if they are still taking narcotic pain mediation during the daytime hours. Most patients wean themselves off of pain medication within 2-5 weeks after surgery. You may drive once you discontinue the narcotic pain medication and feel comfortable enough going from gas to break while driving with your right leg.       When to Call the office    - If you have a temperature greater then 101 + other symptoms that suggest illness such as nausea/vomiting, altered mental status, extreme fatigue, wound drainage, etc.  - Uncontrolled vomiting   - Loose control of your bladder or bowel function  - Are unable to bear any wieght          Total Knee Replacement: What to Expect at Home  Your Recovery     You had a total knee replacement. The doctor replaced the worn ends of the bones that connect to your knee (thighbone and lower leg bone) with plastic and metal parts.  When you leave the hospital, you should be able to move around with a walker or crutches. But you will need someone to help you at home until you have more energy and can move around better.  You will go home with a bandage and

## 2025-03-31 ENCOUNTER — HOSPITAL ENCOUNTER (OUTPATIENT)
Age: 70
Discharge: HOME HEALTH | End: 2025-04-01
Attending: ORTHOPAEDIC SURGERY | Admitting: ORTHOPAEDIC SURGERY
Payer: MEDICARE

## 2025-03-31 ENCOUNTER — ANESTHESIA EVENT (OUTPATIENT)
Dept: SURGERY | Age: 70
End: 2025-03-31
Payer: MEDICARE

## 2025-03-31 ENCOUNTER — ANESTHESIA (OUTPATIENT)
Dept: SURGERY | Age: 70
End: 2025-03-31
Payer: MEDICARE

## 2025-03-31 DIAGNOSIS — Z96.651 STATUS POST RIGHT KNEE REPLACEMENT: Primary | ICD-10-CM

## 2025-03-31 PROCEDURE — 2709999900 HC NON-CHARGEABLE SUPPLY: Performed by: ORTHOPAEDIC SURGERY

## 2025-03-31 PROCEDURE — 6360000002 HC RX W HCPCS: Performed by: ANESTHESIOLOGY

## 2025-03-31 PROCEDURE — 6360000002 HC RX W HCPCS: Performed by: ORTHOPAEDIC SURGERY

## 2025-03-31 PROCEDURE — 3600000005 HC SURGERY LEVEL 5 BASE: Performed by: ORTHOPAEDIC SURGERY

## 2025-03-31 PROCEDURE — C1713 ANCHOR/SCREW BN/BN,TIS/BN: HCPCS | Performed by: ORTHOPAEDIC SURGERY

## 2025-03-31 PROCEDURE — 2500000003 HC RX 250 WO HCPCS: Performed by: PHYSICIAN ASSISTANT

## 2025-03-31 PROCEDURE — 94760 N-INVAS EAR/PLS OXIMETRY 1: CPT

## 2025-03-31 PROCEDURE — 3700000001 HC ADD 15 MINUTES (ANESTHESIA): Performed by: ORTHOPAEDIC SURGERY

## 2025-03-31 PROCEDURE — 7100000000 HC PACU RECOVERY - FIRST 15 MIN: Performed by: ORTHOPAEDIC SURGERY

## 2025-03-31 PROCEDURE — 97165 OT EVAL LOW COMPLEX 30 MIN: CPT

## 2025-03-31 PROCEDURE — 6360000002 HC RX W HCPCS: Performed by: PHYSICIAN ASSISTANT

## 2025-03-31 PROCEDURE — 6370000000 HC RX 637 (ALT 250 FOR IP): Performed by: PHYSICIAN ASSISTANT

## 2025-03-31 PROCEDURE — 6360000002 HC RX W HCPCS: Performed by: NURSE ANESTHETIST, CERTIFIED REGISTERED

## 2025-03-31 PROCEDURE — 3600000015 HC SURGERY LEVEL 5 ADDTL 15MIN: Performed by: ORTHOPAEDIC SURGERY

## 2025-03-31 PROCEDURE — 2720000010 HC SURG SUPPLY STERILE: Performed by: ORTHOPAEDIC SURGERY

## 2025-03-31 PROCEDURE — 7100000001 HC PACU RECOVERY - ADDTL 15 MIN: Performed by: ORTHOPAEDIC SURGERY

## 2025-03-31 PROCEDURE — 2580000003 HC RX 258: Performed by: ANESTHESIOLOGY

## 2025-03-31 PROCEDURE — 97535 SELF CARE MNGMENT TRAINING: CPT

## 2025-03-31 PROCEDURE — 2500000003 HC RX 250 WO HCPCS

## 2025-03-31 PROCEDURE — 2500000003 HC RX 250 WO HCPCS: Performed by: NURSE ANESTHETIST, CERTIFIED REGISTERED

## 2025-03-31 PROCEDURE — 97530 THERAPEUTIC ACTIVITIES: CPT

## 2025-03-31 PROCEDURE — C1776 JOINT DEVICE (IMPLANTABLE): HCPCS | Performed by: ORTHOPAEDIC SURGERY

## 2025-03-31 PROCEDURE — 6360000002 HC RX W HCPCS

## 2025-03-31 PROCEDURE — 97161 PT EVAL LOW COMPLEX 20 MIN: CPT

## 2025-03-31 PROCEDURE — 6370000000 HC RX 637 (ALT 250 FOR IP): Performed by: ANESTHESIOLOGY

## 2025-03-31 PROCEDURE — 64447 NJX AA&/STRD FEMORAL NRV IMG: CPT | Performed by: ANESTHESIOLOGY

## 2025-03-31 PROCEDURE — 27447 TOTAL KNEE ARTHROPLASTY: CPT | Performed by: ORTHOPAEDIC SURGERY

## 2025-03-31 PROCEDURE — 94761 N-INVAS EAR/PLS OXIMETRY MLT: CPT

## 2025-03-31 PROCEDURE — 20985 CPTR-ASST DIR MS PX: CPT | Performed by: ORTHOPAEDIC SURGERY

## 2025-03-31 PROCEDURE — 3700000000 HC ANESTHESIA ATTENDED CARE: Performed by: ORTHOPAEDIC SURGERY

## 2025-03-31 DEVICE — DEPUY CMW 2 FAST SET BONE CEMENT 20G: Type: IMPLANTABLE DEVICE | Site: KNEE | Status: FUNCTIONAL

## 2025-03-31 DEVICE — ASYMMETRIC PATELLA
Type: IMPLANTABLE DEVICE | Site: KNEE | Status: FUNCTIONAL
Brand: TRIATHLON

## 2025-03-31 DEVICE — CRUCIATE RETAINING FEMORAL
Type: IMPLANTABLE DEVICE | Site: KNEE | Status: FUNCTIONAL
Brand: TRIATHLON

## 2025-03-31 DEVICE — COMPONENT TOT KNEE CAPPED PRIMARY K2STRYKER] STRYKER CORP]: Type: IMPLANTABLE DEVICE | Status: FUNCTIONAL

## 2025-03-31 DEVICE — TIBIAL COMPONENT
Type: IMPLANTABLE DEVICE | Site: KNEE | Status: FUNCTIONAL
Brand: TRIATHLON

## 2025-03-31 DEVICE — TIBIAL BEARING INSERT - CS
Type: IMPLANTABLE DEVICE | Site: KNEE | Status: FUNCTIONAL
Brand: TRIATHLON

## 2025-03-31 RX ORDER — SODIUM CHLORIDE 0.9 % (FLUSH) 0.9 %
5-40 SYRINGE (ML) INJECTION PRN
Status: DISCONTINUED | OUTPATIENT
Start: 2025-03-31 | End: 2025-04-01 | Stop reason: HOSPADM

## 2025-03-31 RX ORDER — PROCHLORPERAZINE EDISYLATE 5 MG/ML
5 INJECTION INTRAMUSCULAR; INTRAVENOUS
Status: DISCONTINUED | OUTPATIENT
Start: 2025-03-31 | End: 2025-03-31 | Stop reason: HOSPADM

## 2025-03-31 RX ORDER — LIDOCAINE HYDROCHLORIDE 20 MG/ML
INJECTION, SOLUTION EPIDURAL; INFILTRATION; INTRACAUDAL; PERINEURAL
Status: DISCONTINUED | OUTPATIENT
Start: 2025-03-31 | End: 2025-03-31 | Stop reason: SDUPTHER

## 2025-03-31 RX ORDER — SODIUM CHLORIDE 9 MG/ML
INJECTION, SOLUTION INTRAVENOUS PRN
Status: DISCONTINUED | OUTPATIENT
Start: 2025-03-31 | End: 2025-04-01 | Stop reason: HOSPADM

## 2025-03-31 RX ORDER — ROPIVACAINE HYDROCHLORIDE 2 MG/ML
INJECTION, SOLUTION EPIDURAL; INFILTRATION; PERINEURAL
Status: COMPLETED | OUTPATIENT
Start: 2025-03-31 | End: 2025-03-31

## 2025-03-31 RX ORDER — PROMETHAZINE HYDROCHLORIDE 25 MG/ML
25 INJECTION, SOLUTION INTRAMUSCULAR; INTRAVENOUS EVERY 6 HOURS PRN
Status: DISCONTINUED | OUTPATIENT
Start: 2025-03-31 | End: 2025-03-31

## 2025-03-31 RX ORDER — ACETAMINOPHEN 500 MG
1000 TABLET ORAL ONCE
Status: DISCONTINUED | OUTPATIENT
Start: 2025-03-31 | End: 2025-03-31

## 2025-03-31 RX ORDER — PANTOPRAZOLE SODIUM 40 MG/1
40 TABLET, DELAYED RELEASE ORAL DAILY PRN
Status: DISCONTINUED | OUTPATIENT
Start: 2025-03-31 | End: 2025-04-01 | Stop reason: HOSPADM

## 2025-03-31 RX ORDER — SODIUM CHLORIDE 0.9 % (FLUSH) 0.9 %
5-40 SYRINGE (ML) INJECTION EVERY 12 HOURS SCHEDULED
Status: DISCONTINUED | OUTPATIENT
Start: 2025-03-31 | End: 2025-03-31 | Stop reason: HOSPADM

## 2025-03-31 RX ORDER — SENNA AND DOCUSATE SODIUM 50; 8.6 MG/1; MG/1
2 TABLET, FILM COATED ORAL 2 TIMES DAILY PRN
Status: DISCONTINUED | OUTPATIENT
Start: 2025-03-31 | End: 2025-04-01 | Stop reason: HOSPADM

## 2025-03-31 RX ORDER — SODIUM CHLORIDE, SODIUM LACTATE, POTASSIUM CHLORIDE, CALCIUM CHLORIDE 600; 310; 30; 20 MG/100ML; MG/100ML; MG/100ML; MG/100ML
INJECTION, SOLUTION INTRAVENOUS CONTINUOUS
Status: DISCONTINUED | OUTPATIENT
Start: 2025-03-31 | End: 2025-03-31 | Stop reason: HOSPADM

## 2025-03-31 RX ORDER — ONDANSETRON 2 MG/ML
INJECTION INTRAMUSCULAR; INTRAVENOUS
Status: DISCONTINUED | OUTPATIENT
Start: 2025-03-31 | End: 2025-03-31 | Stop reason: SDUPTHER

## 2025-03-31 RX ORDER — ONDANSETRON 2 MG/ML
4 INJECTION INTRAMUSCULAR; INTRAVENOUS EVERY 6 HOURS PRN
Status: DISCONTINUED | OUTPATIENT
Start: 2025-03-31 | End: 2025-04-01 | Stop reason: HOSPADM

## 2025-03-31 RX ORDER — ASPIRIN 81 MG/1
81 TABLET ORAL 2 TIMES DAILY
Status: DISCONTINUED | OUTPATIENT
Start: 2025-03-31 | End: 2025-04-01 | Stop reason: HOSPADM

## 2025-03-31 RX ORDER — DIPHENHYDRAMINE HYDROCHLORIDE 50 MG/ML
25 INJECTION, SOLUTION INTRAMUSCULAR; INTRAVENOUS EVERY 6 HOURS PRN
Status: DISCONTINUED | OUTPATIENT
Start: 2025-03-31 | End: 2025-04-01 | Stop reason: HOSPADM

## 2025-03-31 RX ORDER — SODIUM CHLORIDE 9 MG/ML
INJECTION, SOLUTION INTRAVENOUS PRN
Status: DISCONTINUED | OUTPATIENT
Start: 2025-03-31 | End: 2025-03-31 | Stop reason: HOSPADM

## 2025-03-31 RX ORDER — SODIUM CHLORIDE 0.9 % (FLUSH) 0.9 %
5-40 SYRINGE (ML) INJECTION PRN
Status: DISCONTINUED | OUTPATIENT
Start: 2025-03-31 | End: 2025-03-31 | Stop reason: HOSPADM

## 2025-03-31 RX ORDER — ROPIVACAINE HYDROCHLORIDE 2 MG/ML
INJECTION, SOLUTION EPIDURAL; INFILTRATION; PERINEURAL PRN
Status: DISCONTINUED | OUTPATIENT
Start: 2025-03-31 | End: 2025-03-31 | Stop reason: ALTCHOICE

## 2025-03-31 RX ORDER — EPHEDRINE SULFATE 5 MG/ML
INJECTION INTRAVENOUS
Status: DISCONTINUED | OUTPATIENT
Start: 2025-03-31 | End: 2025-03-31 | Stop reason: SDUPTHER

## 2025-03-31 RX ORDER — SENNA AND DOCUSATE SODIUM 50; 8.6 MG/1; MG/1
1 TABLET, FILM COATED ORAL 2 TIMES DAILY
Status: DISCONTINUED | OUTPATIENT
Start: 2025-03-31 | End: 2025-04-01 | Stop reason: HOSPADM

## 2025-03-31 RX ORDER — MIDAZOLAM HYDROCHLORIDE 2 MG/2ML
2 INJECTION, SOLUTION INTRAMUSCULAR; INTRAVENOUS
Status: COMPLETED | OUTPATIENT
Start: 2025-03-31 | End: 2025-03-31

## 2025-03-31 RX ORDER — OXYCODONE HYDROCHLORIDE 5 MG/1
5 TABLET ORAL EVERY 4 HOURS PRN
Status: DISCONTINUED | OUTPATIENT
Start: 2025-03-31 | End: 2025-04-01 | Stop reason: HOSPADM

## 2025-03-31 RX ORDER — OXYCODONE HYDROCHLORIDE 5 MG/1
10 TABLET ORAL EVERY 4 HOURS PRN
Status: DISCONTINUED | OUTPATIENT
Start: 2025-03-31 | End: 2025-04-01 | Stop reason: HOSPADM

## 2025-03-31 RX ORDER — PROPOFOL 10 MG/ML
INJECTION, EMULSION INTRAVENOUS
Status: DISCONTINUED | OUTPATIENT
Start: 2025-03-31 | End: 2025-03-31 | Stop reason: SDUPTHER

## 2025-03-31 RX ORDER — DIPHENHYDRAMINE HCL 25 MG
25 CAPSULE ORAL EVERY 6 HOURS PRN
Status: DISCONTINUED | OUTPATIENT
Start: 2025-03-31 | End: 2025-04-01 | Stop reason: HOSPADM

## 2025-03-31 RX ORDER — SODIUM CHLORIDE 0.9 % (FLUSH) 0.9 %
5-40 SYRINGE (ML) INJECTION EVERY 12 HOURS SCHEDULED
Status: DISCONTINUED | OUTPATIENT
Start: 2025-03-31 | End: 2025-04-01 | Stop reason: HOSPADM

## 2025-03-31 RX ORDER — OXYCODONE HYDROCHLORIDE 5 MG/1
5 TABLET ORAL
Status: DISCONTINUED | OUTPATIENT
Start: 2025-03-31 | End: 2025-03-31 | Stop reason: HOSPADM

## 2025-03-31 RX ORDER — TRANEXAMIC ACID 100 MG/ML
INJECTION, SOLUTION INTRAVENOUS
Status: DISCONTINUED | OUTPATIENT
Start: 2025-03-31 | End: 2025-03-31 | Stop reason: SDUPTHER

## 2025-03-31 RX ORDER — METHOCARBAMOL 750 MG/1
750 TABLET, FILM COATED ORAL EVERY 8 HOURS PRN
Status: DISCONTINUED | OUTPATIENT
Start: 2025-03-31 | End: 2025-04-01 | Stop reason: HOSPADM

## 2025-03-31 RX ORDER — KETOROLAC TROMETHAMINE 30 MG/ML
INJECTION, SOLUTION INTRAMUSCULAR; INTRAVENOUS PRN
Status: DISCONTINUED | OUTPATIENT
Start: 2025-03-31 | End: 2025-03-31 | Stop reason: ALTCHOICE

## 2025-03-31 RX ORDER — ONDANSETRON 4 MG/1
4 TABLET, ORALLY DISINTEGRATING ORAL EVERY 8 HOURS PRN
Status: DISCONTINUED | OUTPATIENT
Start: 2025-03-31 | End: 2025-04-01 | Stop reason: HOSPADM

## 2025-03-31 RX ORDER — GABAPENTIN 100 MG/1
100 CAPSULE ORAL 3 TIMES DAILY PRN
Status: DISCONTINUED | OUTPATIENT
Start: 2025-03-31 | End: 2025-04-01 | Stop reason: HOSPADM

## 2025-03-31 RX ORDER — LIDOCAINE HYDROCHLORIDE 10 MG/ML
1 INJECTION, SOLUTION INFILTRATION; PERINEURAL
Status: DISCONTINUED | OUTPATIENT
Start: 2025-03-31 | End: 2025-03-31 | Stop reason: HOSPADM

## 2025-03-31 RX ORDER — HYDROMORPHONE HYDROCHLORIDE 1 MG/ML
1 INJECTION, SOLUTION INTRAMUSCULAR; INTRAVENOUS; SUBCUTANEOUS
Status: DISCONTINUED | OUTPATIENT
Start: 2025-03-31 | End: 2025-04-01 | Stop reason: HOSPADM

## 2025-03-31 RX ORDER — NALOXONE HYDROCHLORIDE 0.4 MG/ML
INJECTION, SOLUTION INTRAMUSCULAR; INTRAVENOUS; SUBCUTANEOUS PRN
Status: DISCONTINUED | OUTPATIENT
Start: 2025-03-31 | End: 2025-03-31 | Stop reason: HOSPADM

## 2025-03-31 RX ORDER — VANCOMYCIN HYDROCHLORIDE 1 G/20ML
INJECTION, POWDER, LYOPHILIZED, FOR SOLUTION INTRAVENOUS PRN
Status: DISCONTINUED | OUTPATIENT
Start: 2025-03-31 | End: 2025-03-31 | Stop reason: ALTCHOICE

## 2025-03-31 RX ORDER — DEXAMETHASONE SODIUM PHOSPHATE 10 MG/ML
INJECTION, SOLUTION INTRA-ARTICULAR; INTRALESIONAL; INTRAMUSCULAR; INTRAVENOUS; SOFT TISSUE
Status: DISCONTINUED | OUTPATIENT
Start: 2025-03-31 | End: 2025-03-31 | Stop reason: SDUPTHER

## 2025-03-31 RX ORDER — ACETAMINOPHEN 500 MG
1000 TABLET ORAL EVERY 6 HOURS
Status: DISCONTINUED | OUTPATIENT
Start: 2025-03-31 | End: 2025-04-01 | Stop reason: HOSPADM

## 2025-03-31 RX ORDER — BISACODYL 5 MG/1
5 TABLET, DELAYED RELEASE ORAL DAILY PRN
Status: DISCONTINUED | OUTPATIENT
Start: 2025-03-31 | End: 2025-04-01 | Stop reason: HOSPADM

## 2025-03-31 RX ORDER — ACETAMINOPHEN 500 MG
1000 TABLET ORAL ONCE
Status: COMPLETED | OUTPATIENT
Start: 2025-03-31 | End: 2025-03-31

## 2025-03-31 RX ADMIN — ONDANSETRON 4 MG: 2 INJECTION INTRAMUSCULAR; INTRAVENOUS at 11:07

## 2025-03-31 RX ADMIN — OXYCODONE HYDROCHLORIDE 10 MG: 5 TABLET ORAL at 18:39

## 2025-03-31 RX ADMIN — WATER 3000 MG: 100 INJECTION, SOLUTION INTRAVENOUS at 20:35

## 2025-03-31 RX ADMIN — MEPIVACAINE HYDROCHLORIDE 60 MG: 20 INJECTION, SOLUTION EPIDURAL; INFILTRATION at 10:48

## 2025-03-31 RX ADMIN — PROPOFOL 50 MG: 10 INJECTION, EMULSION INTRAVENOUS at 10:57

## 2025-03-31 RX ADMIN — LIDOCAINE HYDROCHLORIDE 60 MG: 20 INJECTION, SOLUTION EPIDURAL; INFILTRATION; INTRACAUDAL; PERINEURAL at 10:57

## 2025-03-31 RX ADMIN — EPHEDRINE SULFATE 10 MG: 5 INJECTION INTRAVENOUS at 11:10

## 2025-03-31 RX ADMIN — SENNOSIDES, DOCUSATE SODIUM 1 TABLET: 50; 8.6 TABLET, FILM COATED ORAL at 20:35

## 2025-03-31 RX ADMIN — PROPOFOL 100 MCG/KG/MIN: 10 INJECTION, EMULSION INTRAVENOUS at 10:58

## 2025-03-31 RX ADMIN — ASPIRIN 81 MG: 81 TABLET, COATED ORAL at 20:35

## 2025-03-31 RX ADMIN — DEXAMETHASONE SODIUM PHOSPHATE 10 MG: 10 INJECTION INTRAMUSCULAR; INTRAVENOUS at 11:07

## 2025-03-31 RX ADMIN — Medication 2000 MG: at 10:57

## 2025-03-31 RX ADMIN — ROPIVACAINE HYDROCHLORIDE 20 ML: 2 INJECTION, SOLUTION EPIDURAL; INFILTRATION at 10:21

## 2025-03-31 RX ADMIN — SODIUM CHLORIDE, SODIUM LACTATE, POTASSIUM CHLORIDE, AND CALCIUM CHLORIDE: .6; .31; .03; .02 INJECTION, SOLUTION INTRAVENOUS at 09:18

## 2025-03-31 RX ADMIN — MIDAZOLAM HYDROCHLORIDE 2 MG: 1 INJECTION, SOLUTION INTRAMUSCULAR; INTRAVENOUS at 10:22

## 2025-03-31 RX ADMIN — OXYCODONE HYDROCHLORIDE 10 MG: 5 TABLET ORAL at 14:25

## 2025-03-31 RX ADMIN — SODIUM CHLORIDE, PRESERVATIVE FREE 10 ML: 5 INJECTION INTRAVENOUS at 20:36

## 2025-03-31 RX ADMIN — ACETAMINOPHEN 1000 MG: 500 TABLET, FILM COATED ORAL at 17:25

## 2025-03-31 RX ADMIN — ACETAMINOPHEN 1000 MG: 500 TABLET, FILM COATED ORAL at 09:19

## 2025-03-31 RX ADMIN — TRANEXAMIC ACID 1000 MG: 100 INJECTION, SOLUTION INTRAVENOUS at 10:57

## 2025-03-31 ASSESSMENT — PAIN SCALES - GENERAL
PAINLEVEL_OUTOF10: 0
PAINLEVEL_OUTOF10: 4
PAINLEVEL_OUTOF10: 0
PAINLEVEL_OUTOF10: 8
PAINLEVEL_OUTOF10: 0
PAINLEVEL_OUTOF10: 5
PAINLEVEL_OUTOF10: 2
PAINLEVEL_OUTOF10: 0
PAINLEVEL_OUTOF10: 7
PAINLEVEL_OUTOF10: 7

## 2025-03-31 ASSESSMENT — PAIN DESCRIPTION - DESCRIPTORS
DESCRIPTORS: ACHING
DESCRIPTORS: THROBBING

## 2025-03-31 ASSESSMENT — KOOS JR
TWISING OR PIVOTING ON KNEE: MODERATE
HOW SEVERE IS YOUR KNEE STIFFNESS AFTER FIRST WAKING IN MORNING: MODERATE
STRAIGHTENING KNEE FULLY: MODERATE
RISING FROM SITTING: MODERATE
KOOS JR TOTAL INTERVAL SCORE: 52.465
BENDING TO THE FLOOR TO PICK UP OBJECT: MODERATE
STANDING UPRIGHT: MODERATE
GOING UP OR DOWN STAIRS: MODERATE

## 2025-03-31 ASSESSMENT — PAIN DESCRIPTION - ORIENTATION
ORIENTATION: RIGHT

## 2025-03-31 ASSESSMENT — PAIN DESCRIPTION - LOCATION
LOCATION: KNEE

## 2025-03-31 ASSESSMENT — PAIN - FUNCTIONAL ASSESSMENT: PAIN_FUNCTIONAL_ASSESSMENT: ACTIVITIES ARE NOT PREVENTED

## 2025-03-31 ASSESSMENT — PAIN DESCRIPTION - PAIN TYPE: TYPE: ACUTE PAIN;SURGICAL PAIN

## 2025-03-31 NOTE — PROGRESS NOTES
family/caregiver on the following: Adaptive Equipment as Needed, Cryocuff/Icepacks, Fall Precautions, Home Exercises, No Pillow Under Knee (TKA), and Walker Management/Safety    Interdisciplinary Patient Education   (Reference education tab)    AFTER TREATMENT PRECAUTIONS: Bed/Chair Locked, Call light within reach, Chair, Needs within reach, and RN notified    INTERDISCIPLINARY COLLABORATION:  RN/ PCT and OT/ ALACNTARA    COMPLIANCE WITH PROGRAM/EXERCISE: Will assess as treatment progresses.    RECOMMENDATIONS/INTENT FOR NEXT TREATMENT SESSION: Treatment next visit will focus on increasing Ms. Adams's independence with bed mobility, transfers, gait training, strength/ROM exercises, modalities for pain, and patient education.     TIME IN/OUT:  Time In: 1515  Time Out: 1540  Minutes: 25    GUILLERMO VAZQUEZ PT

## 2025-03-31 NOTE — PROGRESS NOTES
4 Eyes Skin Assessment     NAME:  Aliya Adams  YOB: 1955  MEDICAL RECORD NUMBER:  [id number]    The patient is being assessed for  Admission    I agree that at least one RN has performed a thorough Head to Toe Skin Assessment on the patient. ALL assessment sites listed below have been assessed.      Areas assessed by both nurses:    Head, Face, Ears, Shoulders, Back, Chest, Arms, Elbows, Hands, Sacrum. Buttock, Coccyx, Ischium, and Legs. Feet and Heels        Does the Patient have a Wound? No noted wound(s)       Owen Prevention initiated by RN: Yes  Wound Care Orders initiated by RN: No    Pressure Injury (Stage 3,4, Unstageable, DTI, NWPT, and Complex wounds) if present, place Wound referral order by RN under : No    New Ostomies, if present place, Ostomy referral order under : No     Nurse 1 eSignature: Electronically signed by Kasey Da Silva RN on 3/31/25 at 1:53 PM EDT    **SHARE this note so that the co-signing nurse can place an eSignature**    Nurse 2 eSignature: Electronically signed by JOYA GONZALES RN on 3/31/25 at 1:54 PM EDT

## 2025-03-31 NOTE — PERIOP NOTE
TRANSFER - OUT REPORT:    Verbal report given to Kasey Fong on Aliya Adams  being transferred to On license of [facility] for routine post-op       Report consisted of patient’s Situation, Background, Assessment and   Recommendations(SBAR).     Information from the following report(s) Nurse Handoff Report, Adult Overview, Surgery Report, Cardiac Rhythm SB, and Neuro Assessment was reviewed with the receiving nurse.    Lines:   Peripheral IV 03/31/25 Posterior;Right Hand (Active)   Site Assessment Clean, dry & intact 03/31/25 1304   Line Status Infusing 03/31/25 1304   Line Care Connections checked and tightened 03/31/25 1304   Phlebitis Assessment No symptoms 03/31/25 1304   Infiltration Assessment 0 03/31/25 1304   Dressing Status Clean, dry & intact 03/31/25 1304   Dressing Type Transparent 03/31/25 1304        Opportunity for questions and clarification was provided.      Patient transported with:   O2 @ 2 liters

## 2025-03-31 NOTE — ANESTHESIA POSTPROCEDURE EVALUATION
Department of Anesthesiology  Postprocedure Note    Patient: Aliya Adams  MRN: [id number]  YOB: 1955  Date of evaluation: 3/31/2025    Procedure Summary       Date: 03/31/25 [place] [facility] [place] [facility] [place]    Anesthesia Start: 1042 Anesthesia Stop: 1301    Procedure: rt KNEE TOTAL ARTHROPLASTY ROBOTIC (Right: Knee) Diagnosis:       Primary osteoarthritis of right knee      (Primary osteoarthritis of right knee [M17.11])    Surgeons: Manohar Moy Jr., MD Responsible Provider: Bertha Forrester MD    Anesthesia Type: regional, spinal ASA Status: 3            Anesthesia Type: No value filed.    More Phase I: More Score: 9    More Phase II:      Anesthesia Post Evaluation    Patient location during evaluation: PACU  Patient participation: complete - patient participated  Level of consciousness: awake and alert  Airway patency: patent  Nausea & Vomiting: no nausea and no vomiting  Cardiovascular status: hemodynamically stable  Respiratory status: acceptable, nonlabored ventilation and spontaneous ventilation  Hydration status: euvolemic  Comments: BP (!) 145/79   Pulse 51   Temp 97.9 °F (36.6 °C) (Infrared)   Resp 18   Ht 1.676 m (5' 6\")   Wt 120.3 kg (265 lb 4.8 oz)   SpO2 96%   BMI 42.82 kg/m²     Multimodal analgesia pain management approach  Pain management: adequate and satisfactory to patient    No notable events documented.

## 2025-03-31 NOTE — ANESTHESIA PROCEDURE NOTES
Spinal Block    Patient location during procedure: OR  End time: 3/31/2025 10:56 AM  Reason for block: primary anesthetic  Staffing  Performed: anesthesiologist   Anesthesiologist: Bertha Forrester MD  Performed by: Bertha Forrester MD  Authorized by: Bertha Forrester MD    Spinal Block  Patient position: sitting  Prep: ChloraPrep  Patient monitoring: cardiac monitor, continuous pulse ox and frequent blood pressure checks  Approach: midline  Location: L3/L4  Provider prep: mask and sterile gloves  Local infiltration: lidocaine  Needle  Needle type: pencil-tip   Needle gauge: 25 G  Needle length: 3.5 in  Assessment  Events: SAB placement uncomplicated.  No paresthesia.  Swirl obtained: Yes  CSF: clear  Attempts: 1  Hemodynamics: stable  Additional Notes  3 cc 1% lidocaine local injected at needle insertion site.  Risks/benefits/alternatives discussed including damage to muscle or nerve, bleeding, infection, and a reaction to our medications.    Preanesthetic Checklist  Completed: patient identified, IV checked, risks and benefits discussed, equipment checked, pre-op evaluation, timeout performed, anesthesia consent given, oxygen available and monitors applied/VS acknowledged

## 2025-03-31 NOTE — ANESTHESIA PROCEDURE NOTES
Peripheral Block    Patient location during procedure: pre-op  Reason for block: post-op pain management and at surgeon's request  Start time: 3/31/2025 10:21 AM  End time: 3/31/2025 10:25 AM  Staffing  Performed: anesthesiologist   Anesthesiologist: Bertha Forrester MD  Performed by: Bertha Forrester MD  Authorized by: Bertha Forrester MD    Preanesthetic Checklist  Completed: patient identified, site marked, risks and benefits discussed, equipment checked, pre-op evaluation, timeout performed, anesthesia consent given, oxygen available and monitors applied/VS acknowledged  Peripheral Block   Prep: ChloraPrep  Provider prep: mask and sterile gloves  Patient monitoring: cardiac monitor, continuous pulse ox, oxygen, IV access, frequent blood pressure checks and responsive to questions  Block type: Femoral  Adductor canal  Laterality: right  Injection technique: single-shot  Guidance: ultrasound guided  Local infiltration: lidocaine  Local infiltration: lidocaine    Needle   Needle type: insulated echogenic nerve stimulator needle   Needle gauge: 20 G  Needle localization: ultrasound guidance  Needle length: 10 cm  Assessment   Injection assessment: negative aspiration for heme, no paresthesia on injection, local visualized surrounding nerve on ultrasound and no intravascular symptoms  Slow fractionated injection: yes  Hemodynamics: stable  Outcomes: patient tolerated procedure well and uncomplicated    Additional Notes  Risks/benefits/alternatives discussed including damage to nerve or nerve, bleeding, infection, and a reaction to our medications.    3 cc 1% lidocaine local injected at needle insertion site.  Needle inserted and placed in close proximity to the nerve under real time ultrasound guidance.  Ultrasound was used to visualize the spread of local anesthetic in close proximity to the nerve being blocked.  The nerve appeared anatomically normal and there were no abnormal findings.

## 2025-03-31 NOTE — PROGRESS NOTES
OCCUPATIONAL THERAPY Initial Assessment and Daily Note      (Link to Caseload Tracking: OT Visit Days: 1  OT Orders   Time  OT Charge Capture  Rehab Caseload Tracker  Episode     Aliya Adams is a 70 y.o. female   PRIMARY DIAGNOSIS: Status post right knee replacement  Primary osteoarthritis of right knee [M17.11]  Procedure(s) (LRB):  rt KNEE TOTAL ARTHROPLASTY ROBOTIC (Right)  * Day of Surgery *  Reason for Referral: Pain in Right Knee (M25.561)  Stiffness of Right Knee, Not elsewhere classified (M25.661)  Outpatient in a bed: Payor: [facility] MEDICARE / Plan: [facility] MEDICARE COMPLETE / Product Type: *No Product type* /     ASSESSMENT:     REHAB RECOMMENDATIONS:   Recommendation to date pending progress:  Setting:  No further skilled occupational therapy after discharge from hospital    Equipment:    None     ASSESSMENT:  Ms. Adams is s/p right TKA and presents with decreased independence with functional mobility and activities of daily living as compared to baseline level of function and safety. Patient would benefit from skilled Occupational Therapy to maximize independence and safety with self-care task and functional mobility.   Patient able to complete  toileting and grooming at bathroom with contact guard assist .  Mobilized from hospital bed to bathroom to recliner using a rolling walker with assist. Patient educated on self care compensatory strategies and safety with functional transfers. Patient is hopeful to spend the night to better prepare for safe discharge home       [facility] AM-PAC™ “6 Clicks” Daily Activity Inpatient Short Form:     AM-PAC Daily Activity - Inpatient   How much help is needed for putting on and taking off regular lower body clothing?: A Little  How much help is needed for bathing (which includes washing, rinsing, drying)?: A Little  How much help is needed for toileting (which includes using toilet, bedpan, or urinal)?: A Little  How much help is needed for putting on and

## 2025-03-31 NOTE — PROGRESS NOTES
03/31/25 1734   Oxygen Therapy/Pulse Ox   O2 Device None (Room air)   Pulse 80   SpO2 94 %     Patient achieved 2300 Ml/sec on IS.   Patient encouraged to do every hour while awake-patient agreed and demonstrated.   No shortness of breath or distress noted.   [facility] notes reviewed- continuous sat ordered HS

## 2025-03-31 NOTE — INTERVAL H&P NOTE
Update History & Physical    The patient's History and Physical of March 25, 2025 was reviewed with the patient and I examined the patient. There was no change. The surgical site was confirmed by the patient and me.     Plan: The risks, benefits, expected outcome, and alternative to the recommended procedure have been discussed with the patient. Patient understands and wants to proceed with the procedure.     Electronically signed by CARLOS MAO JR, MD on 3/31/2025 at 9:31 AM

## 2025-03-31 NOTE — CARE COORDINATION
Patient is a 70 y.o. year old female admitted for Right TKA . Patient plans to return home on discharge. Order received to arrange home health. Patient without preference towards agency. Referral sent to [facility] by Merry. Patient denies any equipment needs as patient has a walker. Will follow until discharge.      03/31/25 9675   Service Assessment   Patient Orientation Alert and Oriented   Cognition Alert   Services At/After Discharge   Transition of Care Consult (CM Consult) Home Health   Internal Home Health Yes   Services At/After Discharge Home Health;PT   Mode of Transport at Discharge Self   Confirm Follow Up Transport Self   Condition of Participation: Discharge Planning   The Plan for Transition of Care is related to the following treatment goals: improve mobility   The Patient and/or Patient Representative was provided with a Choice of Provider? Patient   The Patient and/Or Patient Representative agree with the Discharge Plan? Yes   Freedom of Choice list was provided with basic dialogue that supports the patient's individualized plan of care/goals, treatment preferences, and shares the quality data associated with the providers?  Yes

## 2025-03-31 NOTE — OP NOTE
[facility]  Octavio Robotic Assisted Total Knee Arthroplasty: Posterior Cruciate Retaining       Patient:Aliya Adams   : 1955  Medical Record Number:[id number]  Pre-operative Diagnosis:  Primary osteoarthritis of right knee [M17.11]  Post-operative Diagnosis: Primary osteoarthritis of right knee [M17.11]  Location: [facility]  Surgeon: Manohar Moy MD   Assistant: Sridevi Miller    Anesthesia: Spinal and ACB    Indications: Patient has end stage arthritis. They have tried and failed conservative management.    Procedure:Procedure(s) (LRB):  rt KNEE TOTAL ARTHROPLASTY ROBOTIC (Right)            CPT- 14268- Total knee arthroplasty           04104- Other procedures on musculoskeletal system            The complexity of the total joint surgery requires the use of a first assistant for positioning, retraction and expertise in closure.     Tourniquet Time: 0 minutes  EBL: 250 cc  Findings: severe degenerative arthritis with loss of cartilage in medial weight bearing compartment of the knee, no patellar osteophytes with loss leti patella cartilage, no posterior femoral osteophytes   BMI: Body mass index is 42.82 kg/m².    Aliya Adams was brought to the operating room and positioned on the operating table.  She was anesthestized with anesthesia. IV antibiotics were administered. Prior to the incision being made a timeout was called identifying the patient, procedure ,operative side and surgeon The operative leg was prepped and draped in the usual sterile manner.  An anterior longitudinal incision was accomplished just medial to the tibial tubercle and extending approximal 6 centimeters proximal to the superior pole of the patella.  A medial parapatellar capsular incision was performed. The medial capsular flap was elevated around to the insertion of the semimembranous tendon.  The patella was everted and the knee flexed and externally rotated.  The medial and lateral menisci

## 2025-03-31 NOTE — PROGRESS NOTES
TRANSFER - IN REPORT:    Verbal report received from SANAZ Barnett on Aliya Adams  being received from PACU for routine post-op      Report consisted of patient's Situation, Background, Assessment and   Recommendations(SBAR).     Information from the following report(s) Nurse Handoff Report was reviewed with the receiving nurse.    Opportunity for questions and clarification was provided.      Assessment completed upon patient's arrival to unit and care assumed.

## 2025-04-01 ENCOUNTER — APPOINTMENT (OUTPATIENT)
Dept: ULTRASOUND IMAGING | Age: 70
End: 2025-04-01
Attending: ORTHOPAEDIC SURGERY
Payer: MEDICARE

## 2025-04-01 VITALS
WEIGHT: 265.3 LBS | DIASTOLIC BLOOD PRESSURE: 49 MMHG | RESPIRATION RATE: 16 BRPM | HEIGHT: 66 IN | TEMPERATURE: 98.4 F | BODY MASS INDEX: 42.64 KG/M2 | OXYGEN SATURATION: 95 % | HEART RATE: 71 BPM | SYSTOLIC BLOOD PRESSURE: 124 MMHG

## 2025-04-01 LAB
HCT VFR BLD AUTO: 34.4 % (ref 35.8–46.3)
HGB BLD-MCNC: 11.5 G/DL (ref 11.7–15.4)

## 2025-04-01 PROCEDURE — 85018 HEMOGLOBIN: CPT

## 2025-04-01 PROCEDURE — 6360000002 HC RX W HCPCS: Performed by: PHYSICIAN ASSISTANT

## 2025-04-01 PROCEDURE — 85014 HEMATOCRIT: CPT

## 2025-04-01 PROCEDURE — 97530 THERAPEUTIC ACTIVITIES: CPT

## 2025-04-01 PROCEDURE — 36415 COLL VENOUS BLD VENIPUNCTURE: CPT

## 2025-04-01 PROCEDURE — 93970 EXTREMITY STUDY: CPT

## 2025-04-01 PROCEDURE — 99024 POSTOP FOLLOW-UP VISIT: CPT | Performed by: ORTHOPAEDIC SURGERY

## 2025-04-01 PROCEDURE — 6370000000 HC RX 637 (ALT 250 FOR IP): Performed by: PHYSICIAN ASSISTANT

## 2025-04-01 PROCEDURE — 93970 EXTREMITY STUDY: CPT | Performed by: RADIOLOGY

## 2025-04-01 PROCEDURE — 2500000003 HC RX 250 WO HCPCS: Performed by: PHYSICIAN ASSISTANT

## 2025-04-01 PROCEDURE — 97535 SELF CARE MNGMENT TRAINING: CPT

## 2025-04-01 RX ADMIN — SENNOSIDES, DOCUSATE SODIUM 1 TABLET: 50; 8.6 TABLET, FILM COATED ORAL at 08:58

## 2025-04-01 RX ADMIN — ACETAMINOPHEN 1000 MG: 500 TABLET, FILM COATED ORAL at 12:05

## 2025-04-01 RX ADMIN — OXYCODONE HYDROCHLORIDE 10 MG: 5 TABLET ORAL at 13:40

## 2025-04-01 RX ADMIN — WATER 3000 MG: 100 INJECTION, SOLUTION INTRAVENOUS at 05:52

## 2025-04-01 RX ADMIN — OXYCODONE HYDROCHLORIDE 10 MG: 5 TABLET ORAL at 01:58

## 2025-04-01 RX ADMIN — LISINOPRIL 30 MG: 20 TABLET ORAL at 08:57

## 2025-04-01 RX ADMIN — OXYCODONE HYDROCHLORIDE 10 MG: 5 TABLET ORAL at 08:58

## 2025-04-01 RX ADMIN — ACETAMINOPHEN 1000 MG: 500 TABLET, FILM COATED ORAL at 05:52

## 2025-04-01 RX ADMIN — ASPIRIN 81 MG: 81 TABLET, COATED ORAL at 08:57

## 2025-04-01 ASSESSMENT — PAIN - FUNCTIONAL ASSESSMENT: PAIN_FUNCTIONAL_ASSESSMENT: PREVENTS OR INTERFERES SOME ACTIVE ACTIVITIES AND ADLS

## 2025-04-01 ASSESSMENT — PAIN SCALES - GENERAL
PAINLEVEL_OUTOF10: 7
PAINLEVEL_OUTOF10: 7
PAINLEVEL_OUTOF10: 2
PAINLEVEL_OUTOF10: 4
PAINLEVEL_OUTOF10: 8
PAINLEVEL_OUTOF10: 5

## 2025-04-01 ASSESSMENT — PAIN DESCRIPTION - LOCATION
LOCATION: KNEE

## 2025-04-01 ASSESSMENT — PAIN DESCRIPTION - ORIENTATION
ORIENTATION: RIGHT

## 2025-04-01 ASSESSMENT — PAIN DESCRIPTION - DESCRIPTORS
DESCRIPTORS: ACHING
DESCRIPTORS: ACHING;SORE

## 2025-04-01 NOTE — PROGRESS NOTES
OCCUPATIONAL THERAPY Daily Note, Discharge, and AM      (Link to Caseload Tracking: OT Visit Days: 2  OT Orders   Time  OT Charge Capture  Rehab Caseload Tracker  Episode     Aliya Adams is a 70 y.o. female   PRIMARY DIAGNOSIS: Status post right knee replacement  Primary osteoarthritis of right knee [M17.11]  Procedure(s) (LRB):  rt KNEE TOTAL ARTHROPLASTY ROBOTIC (Right)  1 Day Post-Op  Reason for Referral: Pain in Right Knee (M25.561)  Stiffness of Right Knee, Not elsewhere classified (M25.661)  Outpatient in a bed: Payor: [facility] MEDICARE / Plan: [facility] MEDICARE COMPLETE / Product Type: *No Product type* /     ASSESSMENT:     REHAB RECOMMENDATIONS:   Recommendation to date pending progress:  Setting:  No further skilled occupational therapy after discharge from hospital    Equipment:    None     ASSESSMENT:  Ms. Aadms is s/p right TKA.  Patient completed shower, toileting, grooming and dressing as charted below in ADL grid and is ambulating with rolling walker and stand by assist.  Patient has met 4/4 goals and plans to return home with good family support.  Family able to provide patient with appropriate level of assistance at this time.  OT reviewed safety precautions throughout session and therapy schedule for the remainder of today.  Patient instructed to call for assistance when needing to get up from recliner and all needs in reach.  Patient verbalized understanding of call light.          [facility] AM-PAC™ “6 Clicks” Daily Activity Inpatient Short Form:     AM-PAC Daily Activity - Inpatient   How much help is needed for putting on and taking off regular lower body clothing?: A Little  How much help is needed for bathing (which includes washing, rinsing, drying)?: A Little  How much help is needed for toileting (which includes using toilet, bedpan, or urinal)?: A Little  How much help is needed for putting on and taking off regular upper body clothing?: None  How much help is needed for taking  improved functional mobility and safety.-GOAL MET 4/1/25        PROBLEM LIST:   (Skilled intervention is medically necessary to address:)  Decreased ADL/Functional Activities  Decreased Activity Tolerance  Decreased Balance  Increased Pain   INTERVENTIONS PLANNED:   (Benefits and precautions of occupational therapy have been discussed with the patient.)  Self Care Training  Therapeutic Activity  Therapeutic Exercise/HEP  Neuromuscular Re-education  Manual Therapy  Education         TREATMENT:     TREATMENT:   Self Care (44 minutes): Patient participated in upper body bathing, lower body bathing, toileting, upper body dressing, lower body dressing, grooming, functional mobility, bed mobility, functional transfer, bathroom mobility, assistive device, and compensatory technique in unsupported sitting and standing with minimal verbal cueing to increase independence, decrease assistance required, and increase activity tolerance. The patient was educated on role of occupational therapy, compensatory technique during ADL , strategies to improve safety, proper use of assistive device, recommended equipment, transfer training and safety, and energy conservation techniques during ADL/IADLS and patient verbalized understanding and demonstrated understanding.     AFTER TREATMENT PRECAUTIONS: Bed/Chair Locked, Call light within reach, Chair, Heels floated, Needs within reach, and RN notified    INTERDISCIPLINARY COLLABORATION:  RN/ PCT and PT/ PTA    Interdisciplinary Patient Education  (Reference education tab)    [x] Safe And Effective Hygiene  [x] Fall Precautions  [x] Precautions  [x] D/C Instruction Review [x] Self Care Training and Home Safety  [x] Walker Management/Safety  [x] Adaptive Equipment as Needed  [x] Therapeutic Resting Position of Joint     TOTAL TREATMENT DURATION AND TIME:  Time In: 0700  Time Out: 0744  Minutes: 44    KANA BURRELL, OT

## 2025-04-01 NOTE — PLAN OF CARE
Problem: Pain  Goal: Verbalizes/displays adequate comfort level or baseline comfort level  3/31/2025 2143 by Lily Nolasco RN  Outcome: Progressing  3/31/2025 1354 by Kasey Da Silva RN  Outcome: Progressing     Problem: Safety - Adult  Goal: Free from fall injury  3/31/2025 2143 by Lily Nolasco RN  Outcome: Progressing  3/31/2025 1354 by Kasey Da Silva RN  Outcome: Progressing     Problem: Discharge Planning  Goal: Discharge to home or other facility with appropriate resources  3/31/2025 2143 by Lily Nolasco RN  Outcome: Progressing  3/31/2025 1354 by Kasey Da Silva RN  Outcome: Progressing     Problem: ABCDS Injury Assessment  Goal: Absence of physical injury  Outcome: Progressing

## 2025-04-01 NOTE — CARE COORDINATION
Patient with discharge order for today. Patient discharging home with family support. Patient has met all treatment goals and milestones for discharge. Patient in agreement with discharge plan. Family to provide transportation home. CM following until patient is discharged.        04/01/25 0955   Services At/After Discharge   Transition of Care Consult (CM Consult) Home Health   Internal Home Health Yes   Services At/After Discharge Home Health;PT;Aide services    Resource Information Provided? No   Mode of Transport at Discharge Self   Confirm Follow Up Transport Family   Condition of Participation: Discharge Planning   The Plan for Transition of Care is related to the following treatment goals: improve mobility   The Patient and/or Patient Representative was provided with a Choice of Provider? Patient   The Patient and/Or Patient Representative agree with the Discharge Plan? Yes   Freedom of Choice list was provided with basic dialogue that supports the patient's individualized plan of care/goals, treatment preferences, and shares the quality data associated with the providers?  Yes

## 2025-04-01 NOTE — PROGRESS NOTES
Orthopedic Joint Progress Note    2025  Admit Date: 3/31/2025  Admit Diagnosis: Primary osteoarthritis of right knee [M17.11]    1 Day Post-Op    Subjective:     Aliya Adams is doing well. Pain well-controlled. Ready to go home.     Review of Systems: history of DVT left leg    Objective:     PT/OT:     PATIENT MOBILITY                           Vital Signs:    Blood pressure (!) 124/49, pulse 71, temperature 98.4 °F (36.9 °C), temperature source Oral, resp. rate 16, height 1.676 m (5' 6\"), weight 120.3 kg (265 lb 4.8 oz), SpO2 95%.  Temp (24hrs), Av.1 °F (36.7 °C), Min:97.7 °F (36.5 °C), Max:98.4 °F (36.9 °C)      Pain Control:        Meds:  Current Facility-Administered Medications   Medication Dose Route Frequency    sodium chloride flush 0.9 % injection 5-40 mL  5-40 mL IntraVENous 2 times per day    sodium chloride flush 0.9 % injection 5-40 mL  5-40 mL IntraVENous PRN    0.9 % sodium chloride infusion   IntraVENous PRN    acetaminophen (TYLENOL) tablet 1,000 mg  1,000 mg Oral Q6H    oxyCODONE (ROXICODONE) immediate release tablet 5 mg  5 mg Oral Q4H PRN    Or    oxyCODONE (ROXICODONE) immediate release tablet 10 mg  10 mg Oral Q4H PRN    HYDROmorphone HCl PF (DILAUDID) injection 1 mg  1 mg IntraVENous Q3H PRN    methocarbamol (ROBAXIN) tablet 750 mg  750 mg Oral Q8H PRN    ondansetron (ZOFRAN-ODT) disintegrating tablet 4 mg  4 mg Oral Q8H PRN    Or    ondansetron (ZOFRAN) injection 4 mg  4 mg IntraVENous Q6H PRN    sennosides-docusate sodium (SENOKOT-S) 8.6-50 MG tablet 1 tablet  1 tablet Oral BID    aspirin EC tablet 81 mg  81 mg Oral BID    diphenhydrAMINE (BENADRYL) capsule 25 mg  25 mg Oral Q6H PRN    Or    diphenhydrAMINE (BENADRYL) injection 25 mg  25 mg IntraVENous Q6H PRN    lisinopril (PRINIVIL;ZESTRIL) tablet 30 mg  30 mg Oral Daily    pantoprazole (PROTONIX) tablet 40 mg  40 mg Oral Daily PRN    phenol 1.4 % mouth spray 1 spray  1 spray Mouth/Throat Q2H PRN    melatonin tablet 3 mg

## 2025-04-01 NOTE — FLOWSHEET NOTE
04/01/25 1636   AVS Reviewed   AVS & discharge instructions reviewed with patient and/or representative? Yes   Reviewed instructions with Patient   Level of Understanding Questions answered     Iv removed without complications.

## 2025-04-01 NOTE — PROGRESS NOTES
ACUTE PHYSICAL THERAPY GOALS:   (Developed with and agreed upon by patient and/or caregiver.)    GOALS (1-4 days):  (1.)Ms. Adams will move from supine to sit and sit to supine  in bed with MODIFIED INDEPENDENCE.  (2.)Ms. Adams will transfer from bed to chair and chair to bed with MODIFIED INDEPENDENCE using the least restrictive device.  (3.)Ms. Adams will ambulate with MODIFIED INDEPENDENCE for 500 feet with the least restrictive device.  (4.)Ms. Adams will ambulate up/down 1 steps with 0 railing with SUPERVISION using rolling walker.  (5.)Ms. Adams will increase right knee ROM to 0-90°.  ________________________________________________________________________________________________    PHYSICAL THERAPY: TOTAL KNEE ARTHROPLASTY Daily Note and AM  (Link to Caseload Tracking: PT Visit Days : 2  Acknowledge Orders  Time In/Out  PT Charge Capture  Rehab Caseload Tracker  Episode     Aliya Adams is a 70 y.o. female   PRIMARY DIAGNOSIS: Status post right knee replacement  Primary osteoarthritis of right knee [M17.11]  Procedure(s) (LRB):  rt KNEE TOTAL ARTHROPLASTY ROBOTIC (Right)  1 Day Post-Op  Reason for Referral: Pain in Right Knee (M25.561)  Stiffness of Right Knee, Not elsewhere classified (M25.661)  Difficulty in walking, Not elsewhere classified (R26.2)  Other abnormalities of gait and mobility (R26.89)  Outpatient in a bed: Payor: [facility] MEDICARE / Plan: [facility] MEDICARE COMPLETE / Product Type: *No Product type* /     REHAB RECOMMENDATIONS:   Recommendation to date pending progress:  Setting:  Home Health Therapy    Equipment:    To Be Determined     RANGE OF MOTION:   Right Knee Flexion: R Knee Flexion (0-145): 73  Right Knee Extension: R Knee Extension (0): 8     GAIT: I Mod I S SBA CGA Min Mod Max Total  NT x2 Comments:   Level of Assistance [] [] [] [] [] [] [] [] [] [] []            Weightbearing Status       Distance  128 (+ 30) feet    Gait Quality Decreased roberto , Decreased step clearance,  Balance, Coordination, Mobility, Strength, and ROM.    TREATMENT GRID:  THERAPEUTIC  EXERCISES: DATE:  3/31/25 DATE:  4/1   DATE:      AM PM AM PM AM PM    [] [] [] [] [] []   Ankle Pumps  10 15      Quad Sets   15      Gluteal Sets  10 15      Hip Abd/ADduction  10 15      Straight Leg Raises  1 15      Knee Slides   15      Short Arc Quads   15      Chair Slides   15                        B = bilateral; AA = active assistive; A = active; P = passive      Educated patient and/or family/caregiver on the following: Adaptive Equipment as Needed, Cryocuff/Icepacks, Fall Precautions, Home Exercises, No Pillow Under Knee (TKA), and Walker Management/Safety    Interdisciplinary Patient Education   (Reference education tab)    AFTER TREATMENT PRECAUTIONS: Bed/Chair Locked, Call light within reach, Chair, Needs within reach, and RN notified    INTERDISCIPLINARY COLLABORATION:  RN/ PCT    COMPLIANCE WITH PROGRAM/EXERCISE: Will assess as treatment progresses.    RECOMMENDATIONS/INTENT FOR NEXT TREATMENT SESSION: Treatment next visit will focus on increasing Ms. Adams's independence with bed mobility, transfers, gait training, strength/ROM exercises, modalities for pain, and patient education.     TIME IN/OUT:  Time In: 0845  Time Out: 0923  Minutes: 38    JOSE COCHRAN, PTA

## 2025-04-01 NOTE — PROGRESS NOTES
03/31/25 2057   Treatment   Treatment Type IS   Oxygen Therapy/Pulse Ox   O2 Device None (Room air)   Pulse 84   Respirations 18   SpO2 94 %   Pulse Oximeter Device Mode Continuous   Pulse Oximeter Device Location Left;Finger   Pulse Oximeter Low SpO2 Alarm 89   Pulse Oximeter High SpO2 Alarm 100   $Pulse Oximeter $Spot check (multiple/continuous)   Incentive Spirometry Tx   Treatment Effort Assisted by RT;Well   Maximum Achieved Volume (mL) 2500 mL     Patient placed on continuous sat monitor with alarms set per protocol. (%).  Monitor history deleted prior to placing on patient. Patient working on IS achieving 2500 ml. Very good effort, technique. Home CPAP set up at bedside.

## 2025-04-01 NOTE — PROGRESS NOTES
ACUTE PHYSICAL THERAPY GOALS:   (Developed with and agreed upon by patient and/or caregiver.)    GOALS (1-4 days):  (1.)Ms. Adams will move from supine to sit and sit to supine  in bed with MODIFIED INDEPENDENCE.  (2.)Ms. Adams will transfer from bed to chair and chair to bed with MODIFIED INDEPENDENCE using the least restrictive device.  (3.)Ms. Adams will ambulate with MODIFIED INDEPENDENCE for 500 feet with the least restrictive device.  (4.)Ms. Adams will ambulate up/down 1 steps with 0 railing with SUPERVISION using rolling walker.  (5.)Ms. Adams will increase right knee ROM to 0-90°.  ________________________________________________________________________________________________    PHYSICAL THERAPY: TOTAL KNEE ARTHROPLASTY Daily Note and PM  (Link to Caseload Tracking: PT Visit Days : 2  Acknowledge Orders  Time In/Out  PT Charge Capture  Rehab Caseload Tracker  Episode     Aliya Adams is a 70 y.o. female   PRIMARY DIAGNOSIS: Status post right knee replacement  Primary osteoarthritis of right knee [M17.11]  Procedure(s) (LRB):  rt KNEE TOTAL ARTHROPLASTY ROBOTIC (Right)  1 Day Post-Op  Reason for Referral: Pain in Right Knee (M25.561)  Stiffness of Right Knee, Not elsewhere classified (M25.661)  Difficulty in walking, Not elsewhere classified (R26.2)  Other abnormalities of gait and mobility (R26.89)  Outpatient in a bed: Payor: [facility] MEDICARE / Plan: [facility] MEDICARE COMPLETE / Product Type: *No Product type* /     REHAB RECOMMENDATIONS:   Recommendation to date pending progress:  Setting:  Home Health Therapy    Equipment:    To Be Determined     RANGE OF MOTION:   Right Knee Flexion: R Knee Flexion (0-145): 73  Right Knee Extension: R Knee Extension (0): 8     GAIT: I Mod I S SBA CGA Min Mod Max Total  NT x2 Comments:   Level of Assistance [] [] [] [] [] [] [] [] [] [] []         AM    Weightbearing Status       Distance  0 feet    Gait Quality Decreased roberto , Decreased step clearance,  time this afternoon and get up ever 1 hr.  All question answer and ready for D/C.  PM supine upon arrival.  Work on bed mobility as follows:supine>scooting>eob with Supervision.  Decide to exercises in the bed, so return to supine.  Performs R knee HEP see below.  Supine>eob with supervision to perform one more exercises.  Then return back to supine with supervision.  Remain in the bed with needs in reach, ice to R knee and instructed to call for assist, before getting up.     Outcome Measure:   KOOS-JR:  Jr ROBYN. Knee Survey Score: 14    SUBJECTIVE:   Ms. Adams agreeable    Home Environment/Prior Level of Function Lives With: Alone  Type of Home: House  Home Layout: One level  Home Access: Stairs to enter without rails  Entrance Stairs - Number of Steps: 1  Bathroom Shower/Tub: Walk-in shower  Bathroom Equipment: Toilet raiser, Shower chair  Home Equipment: Walker - Rolling, Cane    OBJECTIVE:     PAIN: VITAL SIGNS: LINES/DRAINS:   Pre Treatment:  Pain Location: Knee  Pain Orientation: Right  Pain Descriptors: Aching  Non-Pharmaceutical Pain Intervention(s): Ambulation/Increased Activity;Cold pack;Exercise;Nurse notified (comment)      Post Treatment:  Vitals        Oxygen  O2 Therapy: Room air IV    RESTRICTIONS/PRECAUTIONS:  Restrictions/Precautions: Weight Bearing  Right Lower Extremity Weight Bearing: Weight Bearing As Tolerated        Restrictions/Precautions: Weight Bearing        LOWER EXTREMITY GROSS EVALUATION:   RIGHT LE   Within Functional Limits   Abnormal   Comments   Strength [] [x]  Decreased post surgery   Range of Motion [] [x] AROM RLE (degrees)  R Knee Flexion (0-145): 73  R Knee Extension (0): 8      LEFT LE Within Functional Limits   Abnormal   Comments   Strength [x] []     Range of Motion [x] []       UPPER EXTREMITY GROSS EVALUATION:     Within Functional Limits   Abnormal   Comments   Strength [x] []    Range of Motion [x] []      SENSATION  Sensation [x]       COGNITION/  PERCEPTION:

## 2025-04-07 DIAGNOSIS — Z96.651 HISTORY OF TOTAL KNEE ARTHROPLASTY, RIGHT: Primary | ICD-10-CM

## 2025-04-07 RX ORDER — HYDROCODONE BITARTRATE AND ACETAMINOPHEN 10; 325 MG/1; MG/1
1 TABLET ORAL EVERY 4 HOURS PRN
Qty: 30 TABLET | Refills: 0 | Status: CANCELLED | OUTPATIENT
Start: 2025-04-07 | End: 2025-04-12

## 2025-04-07 RX ORDER — HYDROCODONE BITARTRATE AND ACETAMINOPHEN 7.5; 325 MG/1; MG/1
1 TABLET ORAL EVERY 6 HOURS PRN
Qty: 20 TABLET | Refills: 0 | Status: SHIPPED | OUTPATIENT
Start: 2025-04-07 | End: 2025-04-12

## 2025-04-21 DIAGNOSIS — M17.11 PRIMARY OSTEOARTHRITIS OF RIGHT KNEE: ICD-10-CM

## 2025-04-21 DIAGNOSIS — Z96.651 HISTORY OF TOTAL KNEE ARTHROPLASTY, RIGHT: Primary | ICD-10-CM

## 2025-04-21 RX ORDER — METHOCARBAMOL 750 MG/1
750 TABLET, FILM COATED ORAL 3 TIMES DAILY PRN
Qty: 40 TABLET | Refills: 1 | Status: SHIPPED | OUTPATIENT
Start: 2025-04-21

## 2025-04-25 DIAGNOSIS — M17.11 PRIMARY OSTEOARTHRITIS OF RIGHT KNEE: ICD-10-CM

## 2025-04-25 NOTE — TELEPHONE ENCOUNTER
Patient is still having some nausea. Can you refill her promethazine 12.5mg to [facility] on [address] in [place].

## 2025-04-28 RX ORDER — PROMETHAZINE HYDROCHLORIDE 12.5 MG/1
12.5 TABLET ORAL 4 TIMES DAILY PRN
Qty: 20 TABLET | Refills: 2 | Status: SHIPPED | OUTPATIENT
Start: 2025-04-28

## 2025-04-29 ENCOUNTER — HOSPITAL ENCOUNTER (OUTPATIENT)
Dept: PHYSICAL THERAPY | Age: 70
Setting detail: RECURRING SERIES
Discharge: HOME | End: 2025-05-02
Payer: MEDICARE

## 2025-04-29 DIAGNOSIS — R26.2 DIFFICULTY IN WALKING, NOT ELSEWHERE CLASSIFIED: ICD-10-CM

## 2025-04-29 DIAGNOSIS — M25.561 ACUTE PAIN OF RIGHT KNEE: Primary | ICD-10-CM

## 2025-04-29 DIAGNOSIS — M25.661 STIFFNESS OF RIGHT KNEE, NOT ELSEWHERE CLASSIFIED: ICD-10-CM

## 2025-04-29 PROCEDURE — 97161 PT EVAL LOW COMPLEX 20 MIN: CPT

## 2025-04-29 ASSESSMENT — PAIN SCALES - GENERAL: PAINLEVEL_OUTOF10: 2

## 2025-04-29 NOTE — THERAPY EVALUATION
Aliya Adams  : 1955  Primary: [facility] Medicare Complete (Medicare Managed)  Secondary:  O [facility]  [address]  [address]  [facility] [address]  Phone: [phone]  Fax: [phone] Plan Frequency: 2 x week for 6 weeks  Plan of Care/Certification Expiration Date: 25        Plan of Care/Certification Expiration Date:  Plan of Care/Certification Expiration Date: 25    Frequency/Duration: Plan Frequency: 2 x week for 6 weeks      Time In/Out:   Time In: 1300  Time Out: 1330      PT Visit Info:         Visit Count:  1                OUTPATIENT PHYSICAL THERAPY:             Initial Assessment 2025               Episode (S/P Right TKA)         Treatment Diagnosis:     Acute pain of right knee  Stiffness of right knee, not elsewhere classified  Difficulty in walking, not elsewhere classified  Medical/Referring Diagnosis:    S/P total knee arthroplasty, right [Z96.651]    Referring Physician:  Michelle Parrish PA MD Orders:  PT Eval and Treat   Return MD Appt:  25  Date of Onset:  Onset Date: 25     Allergies:  Adhesive tape, Bacitracin-polymyxin b, Mangifera indica, Neomycin, and Sulfa antibiotics  Restrictions/Precautions:    None      Medications Last Reviewed: 2025     SUBJECTIVE   History of Injury/Illness (Reason for Referral):  Patient is a 71 y/o female who had complaints of bilateral nee pain for about years.  In May 2023 she underwent a Left TKA in May 2023 with complications of a hematoma.   She began to have increasing right knee pain and  is now S/P Right TKA on 3/30/25 and received home PT until 25.  She is now referred to outpatient PT for continuation of her total knee rehabilitation.  She currently has complaints of right knee pain;  aching in nature and stiffness.  She usually feels better with walking using Rolator to walk longer distances.  Standing is limited to less than 5 minutes, and has stiffness with sitting for long periods. Had

## 2025-04-30 NOTE — PROGRESS NOTES
Aliya Adams  : 1955  Primary: [facility] Medicare Complete (Medicare Managed)  Secondary:  O [facility]  [address]  [address]  [facility] [address]  Phone: [phone]  Fax: [phone] Plan Frequency: 2 x week for 6 weeks    Plan of Care/Certification Expiration Date: 25        Plan of Care/Certification Expiration Date:  Plan of Care/Certification Expiration Date: 25    Frequency/Duration: Plan Frequency: 2 x week for 6 weeks      Time In/Out:   Time In: 1300  Time Out: 1330      PT Visit Info:         Visit Count:  1    OUTPATIENT PHYSICAL THERAPY:   Treatment Note 2025       Episode  (S/P Right TKA)               Treatment Diagnosis:    Acute pain of right knee  Stiffness of right knee, not elsewhere classified  Difficulty in walking, not elsewhere classified  Medical/Referring Diagnosis:    S/P total knee arthroplasty, right [Z96.651]    Referring Physician:  Michelle Parrish PA MD Orders:  PT Eval and Treat   Return MD Appt:  25   Date of Onset:  Onset Date: 25     Allergies:   Adhesive tape, Bacitracin-polymyxin b, Mangifera indica, Neomycin, and Sulfa antibiotics  Restrictions/Precautions:   None      Interventions Planned (Treatment may consist of any combination of the following):     See Assessment Note    Subjective Comments:   She currently has complaints of right knee pain;  aching in nature and stiffness.  She usually feels better with walking using Rolator to walk longer distances.  Standing is limited to less than 5 minutes, and has stiffness with sitting for long periods.   Initial Pain Level:     2/10  Post Session Pain Level:      2/10  Medications Last Reviewed: 2025  Updated Objective Findings:  See Evaluation Note from today  Treatment   THERAPEUTIC EXERCISE: (5 minutes):    Exercises per grid below to improve mobility and strength.  Required minimal verbal cues to promote proper body alignment.  Progressed complexity of movement as

## 2025-05-01 ENCOUNTER — OFFICE VISIT (OUTPATIENT)
Dept: ORTHOPEDIC SURGERY | Age: 70
End: 2025-05-01

## 2025-05-01 DIAGNOSIS — Z96.651 HISTORY OF TOTAL KNEE ARTHROPLASTY, RIGHT: Primary | ICD-10-CM

## 2025-05-01 PROCEDURE — 99024 POSTOP FOLLOW-UP VISIT: CPT | Performed by: PHYSICIAN ASSISTANT

## 2025-05-01 NOTE — PROGRESS NOTES
Post-op TKA Visit      05/01/25     Allergies   Allergen Reactions    Adhesive Tape Rash    Bacitracin-Polymyxin B Itching    Mangifera Indica Rash     \"SKIN ON RAY\"    Neomycin Hives     Swelling    Sulfa Antibiotics Other (See Comments)     fever     Current Outpatient Medications on File Prior to Visit   Medication Sig Dispense Refill    promethazine (PHENERGAN) 12.5 MG tablet Take 1 tablet by mouth 4 times daily as needed for Nausea 20 tablet 2    methocarbamol (ROBAXIN-750) 750 MG tablet Take 1 tablet by mouth 3 times daily as needed (muscle spasm or cramps) 40 tablet 1    aspirin (ASPIRIN 81) 81 MG EC tablet Take 1 tablet by mouth in the morning and at bedtime 70 tablet 0    meloxicam (MOBIC) 7.5 MG tablet Take 1-2 tablets by mouth daily 60 tablet 1    mupirocin (BACTROBAN) 2 % ointment Apply 1 Application topically daily      OZEMPIC, 0.25 OR 0.5 MG/DOSE, 2 MG/3ML SOPN Inject 0.25 mg into the skin once a week Injects on Fridays, Takes for weight loss not diabetes      atorvastatin (LIPITOR) 40 MG tablet TAKE ONE TABLET BY MOUTH THREE TIMES A WEEK      lisinopril (PRINIVIL;ZESTRIL) 20 MG tablet TAKE ONE AND ONE-HALF TABLETS BY MOUTH ONE TIME DAILY      clobetasol (TEMOVATE) 0.05 % cream Apply topically as needed      augmented betamethasone dipropionate (DIPROLENE-AF) 0.05 % cream Apply topically      vitamin D3 (CHOLECALCIFEROL) 125 MCG (5000 UT) TABS tablet Take 1 tablet by mouth every morning      cetirizine (ZYRTEC) 10 MG tablet Take by mouth as needed       No current facility-administered medications on file prior to visit.        5 weeks Status Post right TKA     History: The patient returns today for post-op visit following right TKA.   Their pain is improving. They are ambulating with a cane as a  walking aid. They have completed home physical therapy and started outpatient therapy which is going well.   They are pleased with their results thus far. Denies any new complaints today.     Physical

## 2025-05-06 ENCOUNTER — HOSPITAL ENCOUNTER (OUTPATIENT)
Dept: PHYSICAL THERAPY | Age: 70
Setting detail: RECURRING SERIES
Discharge: HOME | End: 2025-05-09
Payer: MEDICARE

## 2025-05-06 PROCEDURE — 97110 THERAPEUTIC EXERCISES: CPT

## 2025-05-06 PROCEDURE — 97140 MANUAL THERAPY 1/> REGIONS: CPT

## 2025-05-06 ASSESSMENT — PAIN SCALES - GENERAL: PAINLEVEL_OUTOF10: 3

## 2025-05-06 NOTE — PROGRESS NOTES
Aliya Adams  : 1955  Primary: [facility] Medicare Complete (Medicare Managed)  Secondary:  O [facility]  [address]  [address]  [facility] [address]  Phone: [phone]  Fax: [phone] Plan Frequency: 2 x week for 6 weeks    Plan of Care/Certification Expiration Date: 25        Plan of Care/Certification Expiration Date:  Plan of Care/Certification Expiration Date: 25    Frequency/Duration: Plan Frequency: 2 x week for 6 weeks      Time In/Out:   Time In: 1300  Time Out: 1345      PT Visit Info:         Visit Count:  2    OUTPATIENT PHYSICAL THERAPY:   Treatment Note 2025       Episode  (S/P Right TKA)               Treatment Diagnosis:    Acute pain of right knee  Stiffness of right knee, not elsewhere classified  Difficulty in walking, not elsewhere classified  Medical/Referring Diagnosis:    No admission diagnoses are documented for this encounter.    Referring Physician:  Michelle Parrish PA MD Orders:  PT Eval and Treat   Return MD Appt:  25   Date of Onset:  Onset Date: 25     Allergies:   Adhesive tape, Bacitracin-polymyxin b, Mangifera indica, Neomycin, and Sulfa antibiotics  Restrictions/Precautions:   None      Interventions Planned (Treatment may consist of any combination of the following):     See Assessment Note    Subjective Comments:   She currently has complaints of right knee pain;  aching in nature and stiffness.  She usually feels better with walking using Rolator to walk longer distances.  Standing is limited to less than 5 minutes, and has stiffness with sitting for long periods.   25 Pain is mostly located behind her knee today.  Feeling less of the effects of anaesthesia  Initial Pain Level:     3/10  Post Session Pain Level:      2/10  Medications Last Reviewed: 2025  Updated Objective Findings:   ROM:  -10 - 100  Treatment   MANUAL THERAPY: (8 minutes):   Joint mobilization and Soft tissue mobilization was utilized and necessary

## 2025-05-09 ENCOUNTER — HOSPITAL ENCOUNTER (OUTPATIENT)
Dept: PHYSICAL THERAPY | Age: 70
Setting detail: RECURRING SERIES
Discharge: HOME | End: 2025-05-12
Payer: MEDICARE

## 2025-05-09 PROCEDURE — 97110 THERAPEUTIC EXERCISES: CPT

## 2025-05-09 ASSESSMENT — PAIN SCALES - GENERAL: PAINLEVEL_OUTOF10: 3

## 2025-05-09 NOTE — PROGRESS NOTES
Aliya Adams  : 1955  Primary: [facility] Medicare Complete (Medicare Managed)  Secondary:  [place] [address]  [address]  [facility] [address]  Phone: [phone]  Fax: [phone] Plan Frequency: 2 x week for 6 weeks    Plan of Care/Certification Expiration Date: 25        Plan of Care/Certification Expiration Date:  Plan of Care/Certification Expiration Date: 25    Frequency/Duration: Plan Frequency: 2 x week for 6 weeks      Time In/Out:   Time In: 1015  Time Out: 1100      PT Visit Info:         Visit Count:  3    OUTPATIENT PHYSICAL THERAPY:   Treatment Note 2025       Episode  (S/P Right TKA)               Treatment Diagnosis:    Acute pain of right knee  Stiffness of right knee, not elsewhere classified  Difficulty in walking, not elsewhere classified  Medical/Referring Diagnosis:    Presence of right artificial knee joint [Z96.651]    Referring Physician:  Michelle Parrish PA MD Orders:  PT Eval and Treat   Return MD Appt:  25   Date of Onset:  Onset Date: 25     Allergies:   Adhesive tape, Bacitracin-polymyxin b, Mangifera indica, Neomycin, and Sulfa antibiotics  Restrictions/Precautions:   None      Interventions Planned (Treatment may consist of any combination of the following):     See Assessment Note    Subjective Comments:   She currently has complaints of right knee pain;  aching in nature and stiffness.  She usually feels better with walking using Rolator to walk longer distances.  Standing is limited to less than 5 minutes, and has stiffness with sitting for long periods.   25 Patient reports feeling stiffness today  Initial Pain Level:     3/10  Post Session Pain Level:      2/10  Medications Last Reviewed: 2025  Updated Objective Findings:   Knee ROM;  -8 -  110  25      Treatment   MANUAL THERAPY: (5 minutes):   Joint mobilization and Soft tissue mobilization was utilized and necessary because of the

## 2025-05-13 ENCOUNTER — HOSPITAL ENCOUNTER (OUTPATIENT)
Dept: PHYSICAL THERAPY | Age: 70
Setting detail: RECURRING SERIES
Discharge: HOME | End: 2025-05-16
Payer: MEDICARE

## 2025-05-13 PROCEDURE — 97140 MANUAL THERAPY 1/> REGIONS: CPT

## 2025-05-13 PROCEDURE — 97110 THERAPEUTIC EXERCISES: CPT

## 2025-05-13 ASSESSMENT — PAIN SCALES - GENERAL: PAINLEVEL_OUTOF10: 3

## 2025-05-13 NOTE — PROGRESS NOTES
Aliya Adams  : 1955  Primary: [facility] Medicare Complete (Medicare Managed)  Secondary:  [place] [address]  [address]  [facility] [address]  Phone: [phone]  Fax: [phone] Plan Frequency: 2 x week for 6 weeks    Plan of Care/Certification Expiration Date: 25        Plan of Care/Certification Expiration Date:  Plan of Care/Certification Expiration Date: 25    Frequency/Duration: Plan Frequency: 2 x week for 6 weeks      Time In/Out:   Time In: 1345  Time Out: 1445      PT Visit Info:         Visit Count:  4    OUTPATIENT PHYSICAL THERAPY:   Treatment Note 2025       Episode  (S/P Right TKA)               Treatment Diagnosis:    Acute pain of right knee  Stiffness of right knee, not elsewhere classified  Difficulty in walking, not elsewhere classified  Medical/Referring Diagnosis:    Presence of right artificial knee joint [Z96.651]    Referring Physician:  Michelle Parrish PA MD Orders:  PT Eval and Treat   Return MD Appt:  25   Date of Onset:  Onset Date: 25     Allergies:   Adhesive tape, Bacitracin-polymyxin b, Mangifera indica, Neomycin, and Sulfa antibiotics  Restrictions/Precautions:   None      Interventions Planned (Treatment may consist of any combination of the following):     See Assessment Note    Subjective Comments:   She currently has complaints of right knee pain;  aching in nature and stiffness.  She usually feels better with walking using Rolator to walk longer distances.  Standing is limited to less than 5 minutes, and has stiffness with sitting for long periods.   25 Patient feeling good just some stiffness in her knee.   Initial Pain Level:     3/10  Post Session Pain Level:      0/10  Medications Last Reviewed: 2025  Updated Objective Findings:   Knee ROM;  -8 -  110  25: 123* flexion  Treatment   MANUAL THERAPY: (15 minutes):   Joint mobilization and Soft tissue mobilization was

## 2025-05-15 ENCOUNTER — HOSPITAL ENCOUNTER (OUTPATIENT)
Dept: PHYSICAL THERAPY | Age: 70
Setting detail: RECURRING SERIES
Discharge: HOME | End: 2025-05-18
Payer: MEDICARE

## 2025-05-15 PROCEDURE — 97110 THERAPEUTIC EXERCISES: CPT

## 2025-05-15 ASSESSMENT — PAIN SCALES - GENERAL: PAINLEVEL_OUTOF10: 2

## 2025-05-15 NOTE — PROGRESS NOTES
Aliya Adams  : 1955  Primary: [facility] Medicare Complete (Medicare Managed)  Secondary:  [place] [address]  [address] [place] [address]  Phone: [phone]  Fax: [phone] Plan Frequency: 2 x week for 6 weeks    Plan of Care/Certification Expiration Date: 25        Plan of Care/Certification Expiration Date:  Plan of Care/Certification Expiration Date: 25    Frequency/Duration: Plan Frequency: 2 x week for 6 weeks      Time In/Out:   Time In: 1515  Time Out: 1600      PT Visit Info:         Visit Count:  5    OUTPATIENT PHYSICAL THERAPY:   Treatment Note 5/15/2025       Episode  (S/P Right TKA)               Treatment Diagnosis:    Acute pain of right knee  Stiffness of right knee, not elsewhere classified  Difficulty in walking, not elsewhere classified  Medical/Referring Diagnosis:    Presence of right artificial knee joint [Z96.651]    Referring Physician:  Michelle Parrish PA MD Orders:  PT Eval and Treat   Return MD Appt:  25   Date of Onset:  Onset Date: 25     Allergies:   Adhesive tape, Bacitracin-polymyxin b, Mangifera indica, Neomycin, and Sulfa antibiotics  Restrictions/Precautions:   None      Interventions Planned (Treatment may consist of any combination of the following):     See Assessment Note    Subjective Comments:   She currently has complaints of right knee pain;  aching in nature and stiffness.  She usually feels better with walking using Rolator to walk longer distances.  Standing is limited to less than 5 minutes, and has stiffness with sitting for long periods.   5/15/25: Felt great after last session. A little stiff/sore today.   Initial Pain Level:     2/10  Post Session Pain Level:      0/10  Medications Last Reviewed: 5/15/2025  Updated Objective Findings:   Knee ROM;  -8 -  110  25: 123* flexion  Treatment   MANUAL THERAPY: (0 minutes):   Joint mobilization and Soft tissue mobilization was

## 2025-05-19 ENCOUNTER — HOSPITAL ENCOUNTER (OUTPATIENT)
Dept: PHYSICAL THERAPY | Age: 70
Setting detail: RECURRING SERIES
Discharge: HOME | End: 2025-05-22
Payer: MEDICARE

## 2025-05-19 PROCEDURE — 97110 THERAPEUTIC EXERCISES: CPT

## 2025-05-19 ASSESSMENT — PAIN SCALES - GENERAL: PAINLEVEL_OUTOF10: 3

## 2025-05-19 NOTE — PROGRESS NOTES
Aliya Adams  : 1955  Primary: [facility] Medicare Complete (Medicare Managed)  Secondary:  [place] [address]  [address] [place] [address]  Phone: [phone]  Fax: [phone] Plan Frequency: 2 x week for 6 weeks    Plan of Care/Certification Expiration Date: 25        Plan of Care/Certification Expiration Date:  Plan of Care/Certification Expiration Date: 25    Frequency/Duration: Plan Frequency: 2 x week for 6 weeks      Time In/Out:   Time In: 1430  Time Out: 1515      PT Visit Info:         Visit Count:  6    OUTPATIENT PHYSICAL THERAPY:   Treatment Note 2025       Episode  (S/P Right TKA)               Treatment Diagnosis:    Acute pain of right knee  Stiffness of right knee, not elsewhere classified  Difficulty in walking, not elsewhere classified  Medical/Referring Diagnosis:    Presence of right artificial knee joint [Z96.651]    Referring Physician:  Michelle Parrish PA MD Orders:  PT Eval and Treat   Return MD Appt:  25   Date of Onset:  Onset Date: 25     Allergies:   Adhesive tape, Bacitracin-polymyxin b, Mangifera indica, Neomycin, and Sulfa antibiotics  Restrictions/Precautions:   None      Interventions Planned (Treatment may consist of any combination of the following):     See Assessment Note    Subjective Comments:   She currently has complaints of right knee pain;  aching in nature and stiffness.  She usually feels better with walking using Rolator to walk longer distances.  Standing is limited to less than 5 minutes, and has stiffness with sitting for long periods.   25: Felt sore the day after PT. Has a little soreness below the knee.   Initial Pain Level:     3/10  Post Session Pain Level:      0/10  Medications Last Reviewed: 2025  Updated Objective Findings:   Knee ROM;  -8 -  110  25: 123* flexion  Treatment   MANUAL THERAPY: (0 minutes):   Joint mobilization and Soft tissue

## 2025-05-21 ENCOUNTER — HOSPITAL ENCOUNTER (OUTPATIENT)
Dept: PHYSICAL THERAPY | Age: 70
Setting detail: RECURRING SERIES
Discharge: HOME | End: 2025-05-24
Payer: MEDICARE

## 2025-05-21 PROCEDURE — 97110 THERAPEUTIC EXERCISES: CPT

## 2025-05-21 ASSESSMENT — PAIN SCALES - GENERAL: PAINLEVEL_OUTOF10: 2

## 2025-05-21 NOTE — PROGRESS NOTES
step 2x10 B on 1 inch step 2x10 B on 1 inch step 10x with RLE hold 2 sec   Hip extension  15x B on 1 inch step 2x10 B on 1 inch step 2x10 B on 1 inch step    Leg press  2x10 37# BL  2x10 12# SL 2x10 50# BL  2x10 25# SL 2x10 50# BL  2x10 25# SL 2x10 50# BL  2x10 25# SL    [employer] Portal Access Code: [email or web address]  URL: [email or web address]  Date: 05/06/2025  Prepared by: Sarai Cobos    Exercises  - Supine Knee Extension Stretch on Towel Roll  - 1 x daily - 7 x weekly - 1 sets - 1 reps - 3-5 hold  - Supine Quad Set  - 2 x daily - 7 x weekly - 3 sets - 10 reps  - Supine Active Straight Leg Raise  - 2 x daily - 7 x weekly - 2 sets - 10 reps  - Supine Bridge  - 2 x daily - 7 x weekly - 2 sets - 10 reps  - Modified Alberto Stretch  - 2 x daily - 7 x weekly - 1 sets - 3 reps - 20-30 hold  - Supine Heel Slide  - 2 x daily - 7 x weekly - 2 sets - 10 reps    Treatment/Session Summary:    Treatment Assessment:   Pt was challenged w hip abd with SLS today with increased trendelenburg of right knee.    Communication/Consultation:   To work on single limb stance weight acceptance activities over the weekend  Equipment provided today:  None  Recommendations/Intent for next treatment session: Next visit will focus on therapeutic exercises for ROM, strengthening, gait and proprioceptive training, manual therapy and modalities as needed.    >Total Treatment Billable Duration:  43 minutes TE  Time In: 1430  Time Out: 1515     Sarai Cobos PT         Charge Capture  Events  [employer] Portal  Appt Desk  Attendance Report     Future Appointments   Date Time Provider Department Center   5/27/2025  4:00 PM Sarai Cobos PT [facility]   5/29/2025  4:00 PM Sarai Cobos PT [facility]   6/3/2025  1:00 PM Sarai Cobos PT [facility]   6/5/2025  1:45 PM Sarai Cobos PT [facility]   6/10/2025  1:00 PM Sarai Cobos, PT [facility]   6/12/2025

## 2025-05-27 ENCOUNTER — HOSPITAL ENCOUNTER (OUTPATIENT)
Dept: PHYSICAL THERAPY | Age: 70
Setting detail: RECURRING SERIES
Discharge: HOME | End: 2025-05-30
Payer: MEDICARE

## 2025-05-27 PROCEDURE — 97110 THERAPEUTIC EXERCISES: CPT

## 2025-05-27 PROCEDURE — 97140 MANUAL THERAPY 1/> REGIONS: CPT

## 2025-05-27 ASSESSMENT — PAIN SCALES - GENERAL: PAINLEVEL_OUTOF10: 4

## 2025-05-27 NOTE — PROGRESS NOTES
KELLI Adams  : 1955  Primary: [facility] Medicare Complete (Medicare Managed)  Secondary:  [place] [address]  [address]  [facility] [address]  Phone: [phone]  Fax: [phone] Plan Frequency: 2 x week for 6 weeks    Plan of Care/Certification Expiration Date: 25        Plan of Care/Certification Expiration Date:  Plan of Care/Certification Expiration Date: 25    Frequency/Duration: Plan Frequency: 2 x week for 6 weeks      Time In/Out:   Time In: 1600  Time Out: 1645      PT Visit Info:         Visit Count:  8    OUTPATIENT PHYSICAL THERAPY:   Treatment Note 2025       Episode  (S/P Right TKA)               Treatment Diagnosis:    Acute pain of right knee  Stiffness of right knee, not elsewhere classified  Difficulty in walking, not elsewhere classified  Medical/Referring Diagnosis:    Presence of right artificial knee joint [Z96.651]    Referring Physician:  Michelle Parrish PA MD Orders:  PT Eval and Treat   Return MD Appt:  25   Date of Onset:  Onset Date: 25     Allergies:   Adhesive tape, Bacitracin-polymyxin b, Mangifera indica, Neomycin, and Sulfa antibiotics  Restrictions/Precautions:   None      Interventions Planned (Treatment may consist of any combination of the following):     See Assessment Note    Subjective Comments:   She currently has complaints of right knee pain;  aching in nature and stiffness.  She usually feels better with walking using Rolator to walk longer distances.  Standing is limited to less than 5 minutes, and has stiffness with sitting for long periods.   25: Reports doing well some increased soreness post weekend activities   Initial Pain Level:     4/10  Post Session Pain Level:      2/10  Medications Last Reviewed: 2025  Updated Objective Findings:   Knee ROM;  -8 -  110  25: 115* flexion  Treatment   MANUAL THERAPY: (12 minutes):   Joint mobilization and Soft tissue

## 2025-05-29 ENCOUNTER — HOSPITAL ENCOUNTER (OUTPATIENT)
Dept: PHYSICAL THERAPY | Age: 70
Setting detail: RECURRING SERIES
End: 2025-05-29
Payer: MEDICARE

## 2025-05-29 PROCEDURE — 97110 THERAPEUTIC EXERCISES: CPT

## 2025-05-29 ASSESSMENT — PAIN SCALES - GENERAL: PAINLEVEL_OUTOF10: 1

## 2025-05-29 NOTE — PROGRESS NOTES
necessary because of the patient's restricted joint motion and restricted motion of soft tissue.   Patella mobs all glides grade III  Date:  5/13/25 Date:  5/27/25 Date:  5/29/25   Parameters     STM to R quad   Passive stretches into max flexion STM to R quad, scar and patella mobs STM to R quad, scar and patella mobs     THERAPEUTIC EXERCISE: (38 minutes):    Exercises per grid below to improve mobility and strength.  Required minimal verbal cues to promote proper body alignment.  Progressed complexity of movement as indicated.   Date:  5/15/25 Date:  5/19/25 Date:  5/21/25 Date:  5/27/25 Date:  5/29/25   Activity/Exercise Parameters Parameters      Patient education./HEP        Nustep 8 mins L3 8 mins L3 8 mins L3 8 min L3 8 min L3   Hamstring curls 2x10 B in // standing w/ hip ext  20x supine w/ ball 20x supine w/ ball  2x10 B in // standing w/ hip extension 2x10 w2# R in standing w/ hip extension     Knee flexion stretch  10x into 8 inch box      Quad set   10x hold 3 secs  10x hold 3 secs  10x hold 3 secs    SAQ        TKE         SLR   15x w 2#  2 x 10   Bridge        LAQ 2x10 B w/ ball squeeze 2x10 B w/ ball squeeze 2 x 10 with 2# 25 with 3# 3 x 10 with 3# and ball   Squat In // 20x  10x 10x slow 10x slow   Heel raises 2x10 on airex  2 x 10  2 x 10 on airex   SLS    On airex with 2# 2 x 10: B On airex with 2# 2 x 10; B   Calf stretch    3 x 30 sec w wedge 3 x 30 sec   Hip flexor stretch    Alberto test 3 x 30 sec    Ambulation   1 flight of stairs reciprocal with siderail support     Step up/down 10x up and over 6 inch step forwards and sidways 10x up and over 6 inch and 8 inch step      Heel tap  10x B 4 inch step 10x B 4 inch step 2 x 10 with 6\" step; B 2 x 10 with 6\" step; B   Hip hike 2x10 B on 1 inch step 2x10 B on 1 inch step      Hip abduction 2x10 B on 1 inch step 2x10 B on 1 inch step 10x with RLE hold 2 sec     Hip extension 2x10 B on 1 inch step 2x10 B on 1 inch step      Leg press 2x10 50#  BL  2x10 25# SL 2x10 50# BL  2x10 25# SL 2x10 50# BL  2x10 25# SL     Lateral walking     4 x 15 ft in // bar    Fitter balance board     A/P and M/L tipping and balance    [employer] Portal Access Code: [email or web address]  URL: [email or web address]  Date: 05/06/2025  Prepared by: Sarai Cobos    Exercises  - Supine Knee Extension Stretch on Towel Roll  - 1 x daily - 7 x weekly - 1 sets - 1 reps - 3-5 hold  - Supine Quad Set  - 2 x daily - 7 x weekly - 3 sets - 10 reps  - Supine Active Straight Leg Raise  - 2 x daily - 7 x weekly - 2 sets - 10 reps  - Supine Bridge  - 2 x daily - 7 x weekly - 2 sets - 10 reps  - Modified Alberto Stretch  - 2 x daily - 7 x weekly - 1 sets - 3 reps - 20-30 hold  - Supine Heel Slide  - 2 x daily - 7 x weekly - 2 sets - 10 reps    Treatment/Session Summary:    Treatment Assessment:   Pt did well with all exercise activity. Mild knee discomfort reported with fitter balance in M/L direction which resolved quickly  Communication/Consultation:  None today  Equipment provided today:  None  Recommendations/Intent for next treatment session: Next visit will focus on therapeutic exercises for ROM, strengthening, gait and proprioceptive training, manual therapy and modalities as needed.    >Total Treatment Billable Duration:  38 minutes TE, 5 minute manual  Time In: 1600  Time Out: 1645     Sarai Cobos PT         Charge Capture  Events  [employer] Portal  Appt Desk  Attendance Report     Future Appointments   Date Time Provider Department Center   6/3/2025  1:00 PM Sarai Cobos PT [facility]   6/5/2025  1:45 PM Sarai Cobos PT [facility]   6/10/2025  1:00 PM Sarai Cobos PT [facility]   6/12/2025  1:00 PM Sarai Cobos, PT [facility]   10/2/2025  1:45 PM Manohar Moy Jr., MD [facility]

## 2025-06-03 ENCOUNTER — HOSPITAL ENCOUNTER (OUTPATIENT)
Dept: PHYSICAL THERAPY | Age: 70
Setting detail: RECURRING SERIES
Discharge: HOME | End: 2025-06-06
Payer: MEDICARE

## 2025-06-03 PROCEDURE — 97110 THERAPEUTIC EXERCISES: CPT

## 2025-06-03 ASSESSMENT — PAIN SCALES - GENERAL: PAINLEVEL_OUTOF10: 2

## 2025-06-03 NOTE — PROGRESS NOTES
KELLI Adams  : 1955  Primary: [facility] Medicare Complete (Medicare Managed)  Secondary:  [place] [address]  [address]  [facility] [address]  Phone: [phone]  Fax: [phone] Plan Frequency: 2 x week for 6 weeks    Plan of Care/Certification Expiration Date: 25        Plan of Care/Certification Expiration Date:  Plan of Care/Certification Expiration Date: 25    Frequency/Duration: Plan Frequency: 2 x week for 6 weeks      Time In/Out:   Time In: 1300  Time Out: 1345      PT Visit Info:         Visit Count:  10    OUTPATIENT PHYSICAL THERAPY:   Treatment Note 6/3/2025       Episode  (S/P Right TKA)               Treatment Diagnosis:    Acute pain of right knee  Stiffness of right knee, not elsewhere classified  Difficulty in walking, not elsewhere classified  Medical/Referring Diagnosis:    Presence of right artificial knee joint [Z96.651]    Referring Physician:  Michelle Parrish PA MD Orders:  PT Eval and Treat   Return MD Appt:  25   Date of Onset:  Onset Date: 25     Allergies:   Adhesive tape, Bacitracin-polymyxin b, Mangifera indica, Neomycin, and Sulfa antibiotics  Restrictions/Precautions:   None      Interventions Planned (Treatment may consist of any combination of the following):     See Assessment Note    Subjective Comments:   She currently has complaints of right knee pain;  aching in nature and stiffness.  She usually feels better with walking using Rolator to walk longer distances.  Standing is limited to less than 5 minutes, and has stiffness with sitting for long periods.   25: Reports doing well however, still feels some general malaise   Initial Pain Level:     2/10  Post Session Pain Level:      2/10  Medications Last Reviewed: 6/3/2025  Updated Objective Findings:  See Progress Note from today   Treatment   MANUAL THERAPY: (0 minutes):   Joint mobilization and Soft tissue mobilization was utilized and

## 2025-06-03 NOTE — PROGRESS NOTES
Aliya Adams  : 1955  Primary: [facility] Medicare Complete (Medicare Managed)  Secondary:  [place] [address]  [address]  [facility] [address]  Phone: [phone]  Fax: [phone] Plan Frequency: 2 x week for 6 weeks  Plan of Care/Certification Expiration Date: 25        Plan of Care/Certification Expiration Date:  Plan of Care/Certification Expiration Date: 25    Frequency/Duration: Plan Frequency: 2 x week for 6 weeks      Time In/Out:   Time In: 1300  Time Out: 1345      PT Visit Info:         Visit Count:  10                OUTPATIENT PHYSICAL THERAPY:             Progress Report 6/3/2025               Episode (S/P Right TKA)         Treatment Diagnosis:     No data found  Medical/Referring Diagnosis:    Presence of right artificial knee joint [Z96.651]    Referring Physician:  Michelle Parrish PA MD Orders:  PT Eval and Treat   Return MD Appt:  25  Date of Onset:  Onset Date: 25     Allergies:  Adhesive tape, Bacitracin-polymyxin b, Mangifera indica, Neomycin, and Sulfa antibiotics  Restrictions/Precautions:    None      Medications Last Reviewed: 6/3/2025     SUBJECTIVE   As of 6/3/2025, Aliya Adams has attended 10 out of 0 scheduled visits, with 10 cancellation(s) and 0 no shows.    Current Complaints:  Patient is a 71 y/o female who had complaints of bilateral nee pain for about years.  In May 2023 she underwent a Left TKA in May 2023 with complications of a hematoma.   She began to have increasing right knee pain and  is now S/P Right TKA on 3/30/25 and received home PT until 25.  She is now referred to outpatient PT for continuation of her total knee rehabilitation.  She currently has complaints of right knee pain;  aching in nature and stiffness.  She usually feels better with walking using Rolator to walk longer distances.  Standing is limited to less than 5 minutes, and has stiffness with sitting for long periods.

## 2025-06-05 ENCOUNTER — HOSPITAL ENCOUNTER (OUTPATIENT)
Dept: PHYSICAL THERAPY | Age: 70
Setting detail: RECURRING SERIES
Discharge: HOME | End: 2025-06-08
Payer: MEDICARE

## 2025-06-05 PROCEDURE — 97110 THERAPEUTIC EXERCISES: CPT

## 2025-06-05 PROCEDURE — 97140 MANUAL THERAPY 1/> REGIONS: CPT

## 2025-06-05 ASSESSMENT — PAIN SCALES - GENERAL: PAINLEVEL_OUTOF10: 2

## 2025-06-05 NOTE — PROGRESS NOTES
KELLI Adams  : 1955  Primary: [facility] Medicare Complete (Medicare Managed)  Secondary:  [place] [address]  [address]  [facility] [address]  Phone: [phone]  Fax: [phone] Plan Frequency: 2 x week for 6 weeks    Plan of Care/Certification Expiration Date: 25        Plan of Care/Certification Expiration Date:  Plan of Care/Certification Expiration Date: 25    Frequency/Duration: Plan Frequency: 2 x week for 6 weeks      Time In/Out:   Time In: 1345  Time Out: 1430      PT Visit Info:         Visit Count:  11    OUTPATIENT PHYSICAL THERAPY:   Treatment Note 2025       Episode  (S/P Right TKA)               Treatment Diagnosis:    Acute pain of right knee  Stiffness of right knee, not elsewhere classified  Difficulty in walking, not elsewhere classified  Medical/Referring Diagnosis:    Presence of right artificial knee joint [Z96.651]    Referring Physician:  Michelle Parrish PA MD Orders:  PT Eval and Treat   Return MD Appt:  25   Date of Onset:  Onset Date: 25     Allergies:   Adhesive tape, Bacitracin-polymyxin b, Mangifera indica, Neomycin, and Sulfa antibiotics  Restrictions/Precautions:   None      Interventions Planned (Treatment may consist of any combination of the following):     See Assessment Note    Subjective Comments:   She currently has complaints of right knee pain;  aching in nature and stiffness.  She usually feels better with walking using Rolator to walk longer distances.  Standing is limited to less than 5 minutes, and has stiffness with sitting for long periods.   25: Reports doing well however, still feels some general malaise   Initial Pain Level:     2/10  Post Session Pain Level:      /10  Medications Last Reviewed: 2025  Updated Objective Findings:   Knee ROM;  -5 -  113 25    Treatment   MANUAL THERAPY: (8 minutes):   Joint mobilization and Soft tissue mobilization was utilized and

## 2025-06-10 ENCOUNTER — HOSPITAL ENCOUNTER (OUTPATIENT)
Dept: PHYSICAL THERAPY | Age: 70
Setting detail: RECURRING SERIES
Discharge: HOME | End: 2025-06-13
Payer: MEDICARE

## 2025-06-10 PROCEDURE — 97140 MANUAL THERAPY 1/> REGIONS: CPT

## 2025-06-10 PROCEDURE — 97110 THERAPEUTIC EXERCISES: CPT

## 2025-06-10 NOTE — PROGRESS NOTES
[employer] Access Code: [email or web address]  URL: [email or web address]  Date: 05/06/2025  Prepared by: Sarai Cobos    Exercises  - Supine Knee Extension Stretch on Towel Roll  - 1 x daily - 7 x weekly - 1 sets - 1 reps - 3-5 hold  - Supine Quad Set  - 2 x daily - 7 x weekly - 3 sets - 10 reps  - Supine Active Straight Leg Raise  - 2 x daily - 7 x weekly - 2 sets - 10 reps  - Supine Bridge  - 2 x daily - 7 x weekly - 2 sets - 10 reps  - Modified Alberto Stretch  - 2 x daily - 7 x weekly - 1 sets - 3 reps - 20-30 hold  - Supine Heel Slide  - 2 x daily - 7 x weekly - 2 sets - 10 reps    Treatment/Session Summary:    Treatment Assessment:   Pt tolerated treatment showing improving gait pattern.  Some hip complaints with SLS work on right side  Communication/Consultation:  None today  Equipment provided today:  None  Recommendations/Intent for next treatment session: Next visit will focus on therapeutic exercises for ROM, strengthening, gait and proprioceptive training, manual therapy and modalities as needed.    >Total Treatment Billable Duration:  35 minutes TE, 8 minutes manual  Time In: 1300  Time Out: 1350     Sarai Cobos PT         Charge Capture  Events  [employer]  Appt Desk  Attendance Report     Future Appointments   Date Time Provider Department Center   6/19/2025  1:00 PM Sarai Cobos PT [facility]   6/23/2025  1:45 PM Sarai Cobos PT [facility]   10/2/2025  1:45 PM Manohar Moy Jr., MD [facility]

## 2025-06-12 ENCOUNTER — HOSPITAL ENCOUNTER (OUTPATIENT)
Dept: PHYSICAL THERAPY | Age: 70
Setting detail: RECURRING SERIES
Discharge: HOME | End: 2025-06-15
Payer: MEDICARE

## 2025-06-12 ENCOUNTER — APPOINTMENT (OUTPATIENT)
Dept: PHYSICAL THERAPY | Age: 70
End: 2025-06-12
Payer: MEDICARE

## 2025-06-12 PROCEDURE — 97110 THERAPEUTIC EXERCISES: CPT

## 2025-06-12 ASSESSMENT — PAIN SCALES - GENERAL: PAINLEVEL_OUTOF10: 2

## 2025-06-12 NOTE — PROGRESS NOTES
KELLI Adams  : 1955  Primary: [facility] Medicare Complete (Medicare Managed)  Secondary:  [place] [address]  [address]  [facility] [address]  Phone: [phone]  Fax: [phone] Plan Frequency: 2 x week for 6 weeks    Plan of Care/Certification Expiration Date: 25        Plan of Care/Certification Expiration Date:  Plan of Care/Certification Expiration Date: 25    Frequency/Duration: Plan Frequency: 2 x week for 6 weeks      Time In/Out:   Time In: 1347  Time Out: 1435      PT Visit Info:         Visit Count:  13    OUTPATIENT PHYSICAL THERAPY:   Treatment Note 2025       Episode  (S/P Right TKA)               Treatment Diagnosis:    Acute pain of right knee  Stiffness of right knee, not elsewhere classified  Difficulty in walking, not elsewhere classified  Medical/Referring Diagnosis:    Presence of right artificial knee joint [Z96.651]    Referring Physician:  Michelle Parrish PA MD Orders:  PT Eval and Treat   Return MD Appt:  25   Date of Onset:  Onset Date: 25     Allergies:   Adhesive tape, Bacitracin-polymyxin b, Mangifera indica, Neomycin, and Sulfa antibiotics  Restrictions/Precautions:   None      Interventions Planned (Treatment may consist of any combination of the following):     See Assessment Note    Subjective Comments:   She currently has complaints of right knee pain;  aching in nature and stiffness.  She usually feels better with walking using Rolator to walk longer distances.  Standing is limited to less than 5 minutes, and has stiffness with sitting for long periods.   25: Reports she tries to do something every day.  Walks a 1/4 to the pool and continues with pool exericseds, but use the walker to walk that distnace.  Some aching with stand to cook today  Initial Pain Level:     2/10  Post Session Pain Level:      2/10  Medications Last Reviewed: 2025  Updated Objective Findings:   Knee ROM;  -5   113

## 2025-06-17 ENCOUNTER — APPOINTMENT (OUTPATIENT)
Dept: PHYSICAL THERAPY | Age: 70
End: 2025-06-17
Payer: MEDICARE

## 2025-06-19 ENCOUNTER — HOSPITAL ENCOUNTER (OUTPATIENT)
Dept: PHYSICAL THERAPY | Age: 70
Setting detail: RECURRING SERIES
Discharge: HOME | End: 2025-06-22
Payer: MEDICARE

## 2025-06-19 PROCEDURE — 97110 THERAPEUTIC EXERCISES: CPT

## 2025-06-19 PROCEDURE — 97140 MANUAL THERAPY 1/> REGIONS: CPT

## 2025-06-19 ASSESSMENT — PAIN SCALES - GENERAL: PAINLEVEL_OUTOF10: 2

## 2025-06-19 NOTE — PROGRESS NOTES
KELLI Adams  : 1955  Primary: [facility] Medicare Complete (Medicare Managed)  Secondary:  [place] [address]  [address]  [facility] [address]  Phone: [phone]  Fax: [phone] Plan Frequency: 2 x week for 6 weeks    Plan of Care/Certification Expiration Date: 25        Plan of Care/Certification Expiration Date:  Plan of Care/Certification Expiration Date: 25    Frequency/Duration: Plan Frequency: 2 x week for 6 weeks      Time In/Out:   Time In: 1300  Time Out: 1345      PT Visit Info:         Visit Count:  14    OUTPATIENT PHYSICAL THERAPY:   Treatment Note 2025       Episode  (S/P Right TKA)               Treatment Diagnosis:    Acute pain of right knee  Stiffness of right knee, not elsewhere classified  Difficulty in walking, not elsewhere classified  Medical/Referring Diagnosis:    Presence of right artificial knee joint [Z96.651]    Referring Physician:  Michelle Parrish PA MD Orders:  PT Eval and Treat   Return MD Appt:  25   Date of Onset:  Onset Date: 25     Allergies:   Adhesive tape, Bacitracin-polymyxin b, Mangifera indica, Neomycin, and Sulfa antibiotics  Restrictions/Precautions:   None      Interventions Planned (Treatment may consist of any combination of the following):     See Assessment Note    Subjective Comments:   She currently has complaints of right knee pain;  aching in nature and stiffness.  She usually feels better with walking using Rolator to walk longer distances.  Standing is limited to less than 5 minutes, and has stiffness with sitting for long periods.   25  Patient complains of tightness  in the lower aspect of her knee and tightness of her incision  Initial Pain Level:     2/10  Post Session Pain Level:      2/10  Medications Last Reviewed: 2025  Updated Objective Findings:  Knee ROM;  -5 -  113   Treatment   MANUAL THERAPY: ( 18 minutes):   Joint mobilization and Soft tissue

## 2025-06-23 ENCOUNTER — HOSPITAL ENCOUNTER (OUTPATIENT)
Dept: PHYSICAL THERAPY | Age: 70
Setting detail: RECURRING SERIES
Discharge: HOME | End: 2025-06-26
Payer: MEDICARE

## 2025-06-23 PROCEDURE — 97110 THERAPEUTIC EXERCISES: CPT

## 2025-06-23 PROCEDURE — 97140 MANUAL THERAPY 1/> REGIONS: CPT

## 2025-06-23 ASSESSMENT — PAIN SCALES - GENERAL: PAINLEVEL_OUTOF10: 1

## 2025-06-23 NOTE — PROGRESS NOTES
KELLI Adams  : 1955  Primary: [facility] Medicare Complete (Medicare Managed)  Secondary:  [place] [address]  [address]  [facility] [address]  Phone: [phone]  Fax: [phone] Plan Frequency: 2 x week for 6 weeks    Plan of Care/Certification Expiration Date: 25        Plan of Care/Certification Expiration Date:  Plan of Care/Certification Expiration Date: 25    Frequency/Duration: Plan Frequency: 2 x week for 6 weeks      Time In/Out:   Time In: 1345  Time Out: 1430      PT Visit Info:         Visit Count:  15    OUTPATIENT PHYSICAL THERAPY:   Treatment Note 2025       Episode  (S/P Right TKA)               Treatment Diagnosis:    Acute pain of right knee  Stiffness of right knee, not elsewhere classified  Difficulty in walking, not elsewhere classified  Medical/Referring Diagnosis:    Presence of right artificial knee joint [Z96.651]    Referring Physician:  Michelle Parrish PA MD Orders:  PT Eval and Treat   Return MD Appt:  25   Date of Onset:  Onset Date: 25     Allergies:   Adhesive tape, Bacitracin-polymyxin b, Mangifera indica, Neomycin, and Sulfa antibiotics  Restrictions/Precautions:   None      Interventions Planned (Treatment may consist of any combination of the following):     See Assessment Note    Subjective Comments:   She currently has complaints of right knee pain;  aching in nature and stiffness.  She usually feels better with walking using Rolator to walk longer distances.  Standing is limited to less than 5 minutes, and has stiffness with sitting for long periods.   25  Patient complains of tightness  in the lower aspect of her knee and tightness of her incision  Initial Pain Level:     1/10  Post Session Pain Level:      10  Medications Last Reviewed: 2025  Updated Objective Findings:  Knee ROM;  -5 -  113   Treatment   MANUAL THERAPY: ( 8 minutes):   Joint mobilization and Soft tissue

## 2025-06-25 ENCOUNTER — HOSPITAL ENCOUNTER (OUTPATIENT)
Dept: PHYSICAL THERAPY | Age: 70
Setting detail: RECURRING SERIES
Discharge: HOME | End: 2025-06-28
Payer: MEDICARE

## 2025-06-25 DIAGNOSIS — R26.2 DIFFICULTY IN WALKING, NOT ELSEWHERE CLASSIFIED: ICD-10-CM

## 2025-06-25 DIAGNOSIS — M25.561 ACUTE PAIN OF RIGHT KNEE: Primary | ICD-10-CM

## 2025-06-25 DIAGNOSIS — M25.661 STIFFNESS OF RIGHT KNEE, NOT ELSEWHERE CLASSIFIED: ICD-10-CM

## 2025-06-25 PROCEDURE — 97110 THERAPEUTIC EXERCISES: CPT

## 2025-06-25 ASSESSMENT — PAIN SCALES - GENERAL: PAINLEVEL_OUTOF10: 0

## 2025-06-25 NOTE — PROGRESS NOTES
KELLI Adams  : 1955  Primary: [facility] Medicare Complete (Medicare Managed)  Secondary:  [place] [address]   [address]  [facility] [address]  Phone: [phone]  Fax: [phone] Plan Frequency: 2 x week for 6 weeks    Plan of Care/Certification Expiration Date: 25        Plan of Care/Certification Expiration Date:  Plan of Care/Certification Expiration Date: 25    Frequency/Duration: Plan Frequency: 2 x week for 6 weeks      Time In/Out:   Time In: 1515  Time Out: 1600      PT Visit Info:         Visit Count:  16    OUTPATIENT PHYSICAL THERAPY:   Treatment Note 2025       Episode  (S/P Right TKA)               Treatment Diagnosis:    Acute pain of right knee  Stiffness of right knee, not elsewhere classified  Difficulty in walking, not elsewhere classified  Medical/Referring Diagnosis:    Presence of right artificial knee joint [Z96.651]    Referring Physician:  Michelle Parrish PA MD Orders:  PT Eval and Treat   Return MD Appt:  25   Date of Onset:  Onset Date: 25     Allergies:   Adhesive tape, Bacitracin-polymyxin b, Mangifera indica, Neomycin, and Sulfa antibiotics  Restrictions/Precautions:   None      Interventions Planned (Treatment may consist of any combination of the following):     See Assessment Note    Subjective Comments:    Patient able to tolerate sitting for an hour now, and  able to stand for longer periods 30-40 minutes and is cooking more,  Has not been walking for longer distances yet due to weather.  Reports 75-80% return to normal activities   Initial Pain Level:     0/10  Post Session Pain Level:      0/10  Medications Last Reviewed: 2025  Updated Objective Findings:  See Discharge Note from today   Treatment   MANUAL THERAPY: ( 0 minutes):   Joint mobilization and Soft tissue mobilization was utilized and necessary because of the patient's restricted joint motion and restricted motion of soft tissue.

## 2025-07-07 DIAGNOSIS — Z96.651 HISTORY OF TOTAL KNEE ARTHROPLASTY, RIGHT: Primary | ICD-10-CM

## 2025-07-07 RX ORDER — AMOXICILLIN 500 MG/1
TABLET, FILM COATED ORAL
Qty: 12 TABLET | Refills: 2 | Status: SHIPPED | OUTPATIENT
Start: 2025-07-07

## 2025-07-07 NOTE — TELEPHONE ENCOUNTER
She has an appointment for a dental cleaning and needs to get an antibiotic sent to the [facility] on file. Her appt is Wednesday morning.

## (undated) DEVICE — GLOVE SURG SZ 65 THK91MIL LTX FREE SYN POLYISOPRENE

## (undated) DEVICE — GLOVE SURG SZ 8 L12IN FNGR THK79MIL GRN LTX FREE

## (undated) DEVICE — KIT INT FIX FEM TIB CKPT MAKOPLASTY

## (undated) DEVICE — SOL IRR SOD CHL 0.9% TITAN XL CNTNR 3000ML

## (undated) DEVICE — SYRINGE MED 30ML STD CLR PLAS LUERLOCK TIP N CTRL DISP

## (undated) DEVICE — KIT DRP FOR RIO ROBOTIC ARM ASST SYS

## (undated) DEVICE — GLOVE SURG SZ 7 L11.33IN FNGR THK9.8MIL STRW LTX POLYMER

## (undated) DEVICE — SOLUTION WND IRRIGATION 450 ML 0.5 PVP-I 0.9 NACL

## (undated) DEVICE — BLADE RMR L41MM PAT PILOT H

## (undated) DEVICE — GLOVE SURG SZ 75 L12IN FNGR THK79MIL GRN LTX FREE

## (undated) DEVICE — SUTURE ABS ANTIBACT 1-0 CTX 24IN STRATAFIX PDS+ SXPP1A445

## (undated) DEVICE — GLOVE SURG SZ 7 CRM LTX FREE POLYISOPRENE POLYMER BEAD ANTI

## (undated) DEVICE — SUTURE VICRYL SZ 1 L36IN ABSRB UD L36MM CT-1 1/2 CIR J947H

## (undated) DEVICE — BIPOLAR SEALER 23-112-1 AQM 6.0: Brand: AQUAMANTYS ®

## (undated) DEVICE — SUTURE ETHIBOND EXCEL SZ 2 L30IN NONABSORBABLE GRN L40MM V-37 MX69G

## (undated) DEVICE — SUTURE MONOCRYL STRATAFIX SPRL + SZ 2-0 L18IN ABSRB UD CT-1 SXMP1B413

## (undated) DEVICE — SOLUTION IV 250ML 0.9% SOD CHL PH 5 INJ USP VIAFLX PLAS

## (undated) DEVICE — PIN BNE FIX TEMP L110MM DIA4MM MAKO

## (undated) DEVICE — KIT TRK KNEE PROC VIZADISC

## (undated) DEVICE — GLOVE ORANGE PI 8   MSG9080

## (undated) DEVICE — STERILE PRESSURE PROTECTOR PAD® FOR DE MAYO UNIVERSAL DISTRACTOR® (10/CASE): Brand: DE MAYO UNIVERSAL DISTRACTOR®

## (undated) DEVICE — DRESSING HYDROFIBER AQUACEL AG ADVANTAGE 3.5X12 IN

## (undated) DEVICE — SUTURE MONOCRYL 2-0 SH 27IN UND PLUS MCP417

## (undated) DEVICE — X-LARGE COTTON GLOVE: Brand: DEROYAL

## (undated) DEVICE — PIN BNE FIX TEMP L140MM DIA4MM MAKO

## (undated) DEVICE — BLADE SURG SAW S STL NAR OSC W/ SERR EDGE DISP

## (undated) DEVICE — TOTAL KNEE: Brand: MEDLINE INDUSTRIES, INC.

## (undated) DEVICE — HOOD: Brand: T7PLUS